# Patient Record
Sex: FEMALE | Race: WHITE | Employment: OTHER
[De-identification: names, ages, dates, MRNs, and addresses within clinical notes are randomized per-mention and may not be internally consistent; named-entity substitution may affect disease eponyms.]

---

## 2017-01-27 ENCOUNTER — OFFICE VISIT (OUTPATIENT)
Dept: FAMILY MEDICINE CLINIC | Facility: CLINIC | Age: 72
End: 2017-01-27

## 2017-01-27 VITALS
DIASTOLIC BLOOD PRESSURE: 70 MMHG | RESPIRATION RATE: 20 BRPM | WEIGHT: 136.8 LBS | BODY MASS INDEX: 25.5 KG/M2 | HEART RATE: 60 BPM | SYSTOLIC BLOOD PRESSURE: 128 MMHG

## 2017-01-27 DIAGNOSIS — I10 ESSENTIAL HYPERTENSION: Primary | ICD-10-CM

## 2017-01-27 DIAGNOSIS — E78.2 MIXED HYPERLIPIDEMIA: ICD-10-CM

## 2017-01-27 PROCEDURE — 4040F PNEUMOC VAC/ADMIN/RCVD: CPT | Performed by: FAMILY MEDICINE

## 2017-01-27 PROCEDURE — 99213 OFFICE O/P EST LOW 20 MIN: CPT | Performed by: FAMILY MEDICINE

## 2017-01-27 PROCEDURE — 1036F TOBACCO NON-USER: CPT | Performed by: FAMILY MEDICINE

## 2017-01-27 PROCEDURE — G8400 PT W/DXA NO RESULTS DOC: HCPCS | Performed by: FAMILY MEDICINE

## 2017-01-27 PROCEDURE — 1090F PRES/ABSN URINE INCON ASSESS: CPT | Performed by: FAMILY MEDICINE

## 2017-01-27 PROCEDURE — G8420 CALC BMI NORM PARAMETERS: HCPCS | Performed by: FAMILY MEDICINE

## 2017-01-27 PROCEDURE — 1123F ACP DISCUSS/DSCN MKR DOCD: CPT | Performed by: FAMILY MEDICINE

## 2017-01-27 PROCEDURE — 3014F SCREEN MAMMO DOC REV: CPT | Performed by: FAMILY MEDICINE

## 2017-01-27 PROCEDURE — 3017F COLORECTAL CA SCREEN DOC REV: CPT | Performed by: FAMILY MEDICINE

## 2017-01-27 PROCEDURE — G8427 DOCREV CUR MEDS BY ELIG CLIN: HCPCS | Performed by: FAMILY MEDICINE

## 2017-01-27 PROCEDURE — G8484 FLU IMMUNIZE NO ADMIN: HCPCS | Performed by: FAMILY MEDICINE

## 2017-01-27 ASSESSMENT — ENCOUNTER SYMPTOMS
SHORTNESS OF BREATH: 0
ABDOMINAL PAIN: 0
CHEST TIGHTNESS: 0
BLOOD IN STOOL: 0

## 2017-03-10 ENCOUNTER — OFFICE VISIT (OUTPATIENT)
Dept: FAMILY MEDICINE CLINIC | Facility: CLINIC | Age: 72
End: 2017-03-10

## 2017-03-10 VITALS
DIASTOLIC BLOOD PRESSURE: 86 MMHG | BODY MASS INDEX: 25.35 KG/M2 | SYSTOLIC BLOOD PRESSURE: 140 MMHG | HEART RATE: 80 BPM | RESPIRATION RATE: 18 BRPM | WEIGHT: 136 LBS

## 2017-03-10 DIAGNOSIS — E78.2 MIXED HYPERLIPIDEMIA: ICD-10-CM

## 2017-03-10 DIAGNOSIS — M25.561 RIGHT KNEE PAIN, UNSPECIFIED CHRONICITY: Primary | ICD-10-CM

## 2017-03-10 PROCEDURE — 1036F TOBACCO NON-USER: CPT | Performed by: FAMILY MEDICINE

## 2017-03-10 PROCEDURE — 1123F ACP DISCUSS/DSCN MKR DOCD: CPT | Performed by: FAMILY MEDICINE

## 2017-03-10 PROCEDURE — G8427 DOCREV CUR MEDS BY ELIG CLIN: HCPCS | Performed by: FAMILY MEDICINE

## 2017-03-10 PROCEDURE — 3014F SCREEN MAMMO DOC REV: CPT | Performed by: FAMILY MEDICINE

## 2017-03-10 PROCEDURE — 99213 OFFICE O/P EST LOW 20 MIN: CPT | Performed by: FAMILY MEDICINE

## 2017-03-10 PROCEDURE — G8484 FLU IMMUNIZE NO ADMIN: HCPCS | Performed by: FAMILY MEDICINE

## 2017-03-10 PROCEDURE — 4040F PNEUMOC VAC/ADMIN/RCVD: CPT | Performed by: FAMILY MEDICINE

## 2017-03-10 PROCEDURE — 1090F PRES/ABSN URINE INCON ASSESS: CPT | Performed by: FAMILY MEDICINE

## 2017-03-10 PROCEDURE — 3017F COLORECTAL CA SCREEN DOC REV: CPT | Performed by: FAMILY MEDICINE

## 2017-03-10 PROCEDURE — G8420 CALC BMI NORM PARAMETERS: HCPCS | Performed by: FAMILY MEDICINE

## 2017-03-10 PROCEDURE — G8400 PT W/DXA NO RESULTS DOC: HCPCS | Performed by: FAMILY MEDICINE

## 2017-03-10 ASSESSMENT — ENCOUNTER SYMPTOMS
ABDOMINAL PAIN: 0
SHORTNESS OF BREATH: 0
CHEST TIGHTNESS: 0

## 2017-03-14 ENCOUNTER — TELEPHONE (OUTPATIENT)
Dept: ORTHOPEDIC SURGERY | Age: 72
End: 2017-03-14

## 2017-05-01 RX ORDER — LISINOPRIL AND HYDROCHLOROTHIAZIDE 12.5; 1 MG/1; MG/1
TABLET ORAL
Qty: 90 TABLET | Refills: 1 | Status: SHIPPED | OUTPATIENT
Start: 2017-05-01 | End: 2018-02-06 | Stop reason: SDUPTHER

## 2017-07-20 ENCOUNTER — TELEPHONE (OUTPATIENT)
Dept: FAMILY MEDICINE CLINIC | Age: 72
End: 2017-07-20

## 2017-07-20 DIAGNOSIS — E78.2 MIXED HYPERLIPIDEMIA: ICD-10-CM

## 2017-07-20 DIAGNOSIS — I10 ESSENTIAL HYPERTENSION: Primary | ICD-10-CM

## 2017-07-25 LAB
ALT SERPL-CCNC: 15 IU/L (ref 5–59)
ANION GAP SERPL CALCULATED.3IONS-SCNC: 11 MMOL/L
AST SERPL-CCNC: 14 IU/L (ref 10–42)
BUN BLDV-MCNC: 11 MG/DL (ref 10–20)
CALCIUM SERPL-MCNC: 9.4 MG/DL (ref 8.7–10.8)
CHLORIDE BLD-SCNC: 101 MMOL/L (ref 95–111)
CHOLESTEROL/HDL RATIO: 4
CHOLESTEROL: 161 MG/DL
CO2: 29 MMOL/L (ref 21–32)
CREAT SERPL-MCNC: 0.7 MG/DL (ref 0.5–1.3)
EGFR AFRICAN AMERICAN: 100
EGFR IF NONAFRICAN AMERICAN: 83
GLUCOSE: 91 MG/DL (ref 70–100)
HDLC SERPL-MCNC: 40 MG/DL (ref 40–60)
LDL CHOLESTEROL CALCULATED: 97 MG/DL
LDL/HDL RATIO: 2.4
MAGNESIUM: 2.2 MG/DL (ref 1.7–2.4)
POTASSIUM SERPL-SCNC: 4.1 MMOL/L (ref 3.5–5.4)
SODIUM BLD-SCNC: 137 MMOL/L (ref 134–147)
TRIGL SERPL-MCNC: 122 MG/DL
VLDLC SERPL CALC-MCNC: 24 MG/DL

## 2017-07-28 ENCOUNTER — OFFICE VISIT (OUTPATIENT)
Dept: FAMILY MEDICINE CLINIC | Age: 72
End: 2017-07-28
Payer: MEDICARE

## 2017-07-28 VITALS
HEIGHT: 59 IN | SYSTOLIC BLOOD PRESSURE: 120 MMHG | WEIGHT: 136.4 LBS | DIASTOLIC BLOOD PRESSURE: 70 MMHG | BODY MASS INDEX: 27.5 KG/M2 | RESPIRATION RATE: 17 BRPM | HEART RATE: 60 BPM

## 2017-07-28 DIAGNOSIS — I10 ESSENTIAL HYPERTENSION: Primary | ICD-10-CM

## 2017-07-28 DIAGNOSIS — Z12.11 SCREENING FOR COLON CANCER: ICD-10-CM

## 2017-07-28 DIAGNOSIS — E78.2 MIXED HYPERLIPIDEMIA: ICD-10-CM

## 2017-07-28 DIAGNOSIS — Z12.31 ENCOUNTER FOR SCREENING MAMMOGRAM FOR BREAST CANCER: ICD-10-CM

## 2017-07-28 PROCEDURE — 99213 OFFICE O/P EST LOW 20 MIN: CPT | Performed by: FAMILY MEDICINE

## 2017-07-28 PROCEDURE — G8400 PT W/DXA NO RESULTS DOC: HCPCS | Performed by: FAMILY MEDICINE

## 2017-07-28 PROCEDURE — 4040F PNEUMOC VAC/ADMIN/RCVD: CPT | Performed by: FAMILY MEDICINE

## 2017-07-28 PROCEDURE — G8427 DOCREV CUR MEDS BY ELIG CLIN: HCPCS | Performed by: FAMILY MEDICINE

## 2017-07-28 PROCEDURE — G8419 CALC BMI OUT NRM PARAM NOF/U: HCPCS | Performed by: FAMILY MEDICINE

## 2017-07-28 PROCEDURE — 1090F PRES/ABSN URINE INCON ASSESS: CPT | Performed by: FAMILY MEDICINE

## 2017-07-28 PROCEDURE — 1036F TOBACCO NON-USER: CPT | Performed by: FAMILY MEDICINE

## 2017-07-28 PROCEDURE — 1123F ACP DISCUSS/DSCN MKR DOCD: CPT | Performed by: FAMILY MEDICINE

## 2017-07-28 PROCEDURE — 3017F COLORECTAL CA SCREEN DOC REV: CPT | Performed by: FAMILY MEDICINE

## 2017-07-28 PROCEDURE — 3014F SCREEN MAMMO DOC REV: CPT | Performed by: FAMILY MEDICINE

## 2017-07-28 ASSESSMENT — PATIENT HEALTH QUESTIONNAIRE - PHQ9
SUM OF ALL RESPONSES TO PHQ9 QUESTIONS 1 & 2: 0
SUM OF ALL RESPONSES TO PHQ QUESTIONS 1-9: 0
2. FEELING DOWN, DEPRESSED OR HOPELESS: 0
1. LITTLE INTEREST OR PLEASURE IN DOING THINGS: 0

## 2017-07-28 ASSESSMENT — ENCOUNTER SYMPTOMS
ABDOMINAL PAIN: 0
BLOOD IN STOOL: 0
SHORTNESS OF BREATH: 0
CHEST TIGHTNESS: 0

## 2017-09-15 ENCOUNTER — TELEPHONE (OUTPATIENT)
Dept: FAMILY MEDICINE CLINIC | Age: 72
End: 2017-09-15

## 2017-09-15 DIAGNOSIS — Z12.11 SCREENING FOR COLON CANCER: ICD-10-CM

## 2017-09-15 LAB
CONTROL: PRESENT
HEMOCCULT STL QL: NEGATIVE

## 2017-09-15 PROCEDURE — 82274 ASSAY TEST FOR BLOOD FECAL: CPT | Performed by: FAMILY MEDICINE

## 2017-10-03 ENCOUNTER — HOSPITAL ENCOUNTER (EMERGENCY)
Age: 72
Discharge: HOME | End: 2017-10-03
Attending: SPECIALIST
Payer: MEDICARE

## 2017-10-03 VITALS
HEIGHT: 59 IN | DIASTOLIC BLOOD PRESSURE: 71 MMHG | TEMPERATURE: 99.8 F | HEART RATE: 82 BPM | WEIGHT: 130 LBS | OXYGEN SATURATION: 96 % | SYSTOLIC BLOOD PRESSURE: 169 MMHG | BODY MASS INDEX: 26.21 KG/M2 | RESPIRATION RATE: 18 BRPM

## 2017-10-03 DIAGNOSIS — S50.11XA TRAUMATIC HEMATOMA OF RIGHT FOREARM, INITIAL ENCOUNTER: Primary | ICD-10-CM

## 2017-10-03 PROCEDURE — 99283 EMERGENCY DEPT VISIT LOW MDM: CPT

## 2017-10-03 ASSESSMENT — PAIN SCALES - GENERAL: PAINLEVEL_OUTOF10: 1

## 2017-10-03 ASSESSMENT — PAIN DESCRIPTION - LOCATION: LOCATION: ARM

## 2017-10-03 ASSESSMENT — PAIN DESCRIPTION - ORIENTATION: ORIENTATION: RIGHT

## 2017-10-03 NOTE — ED AVS SNAPSHOT
After Visit Summary  (Discharge Instructions)    Medication List for Home    Based on the information you provided to us as well as any changes during this visit, the following is your updated medication list.  Compare this with your prescription bottles at home. If you have any questions or concerns, contact your primary care physician's office. Daily Medication List (This medication list can be shared with any Healthcare provider who is helping you manage your medications)      These are medications you told us you were taking at home, CONTINUE taking them after you leave the hospital     atorvastatin 10 MG tablet   Commonly known as:  LIPITOR   TAKE 1 TABLET BY MOUTH ONE TIME A DAY       lisinopril-hydrochlorothiazide 10-12.5 MG per tablet   Commonly known as:  PRINZIDE;ZESTORETIC   take 1 tablet by mouth once daily               Allergies as of 10/3/2017     No Known Allergies      Immunizations as of 10/3/2017     No immunizations on file. After Visit Summary    This summary was created for you. Thank you for entrusting your care to us. The following information includes details about your hospital/visit stay along with steps you should take to help with your recovery once you leave the hospital.  In this packet, you will find information about the topics listed below:    · Instructions about your medications including a list of your home medications  · A summary of your hospital visit  · Follow-up appointments once you have left the hospital  · Your care plan at home      You may receive a survey regarding the care you received during your stay. Your input is valuable to us. We encourage you to complete and return your survey in the envelope provided. We hope you will choose us in the future for your healthcare needs.           Patient Information     Patient Name IRMA Mcadams 1945      Care Provided at:     Name             Geronimo Kerr Your Visit    Here you will find information about your visit, including the reason for your visit. Please take this sheet with you when you visit your doctor or other health care provider in the future. It will help determine the best possible medical care for you at that time. If you have any questions once you leave the hospital, please call the department phone number listed below. Diagnoses this visit     Your diagnosis was TRAUMATIC HEMATOMA OF RIGHT FOREARM, INITIAL ENCOUNTER. Visit Information     Date of Visit Department Dept Phone    10/3/2017 [facility] [phone]      You were seen by     You were seen by Freddie Arango MD.       Follow-up Appointments    Below is a list of your follow-up and future appointments. This may not be a complete list as you may have made appointments directly with providers that we are not aware of or your providers may have made some for you. Please call your providers to confirm appointments. It is important to keep your appointments. Please bring your current insurance card, photo ID, co-pay, and all medication bottles to your appointment. If self-pay, payment is expected at the time of service. Follow-up Information     Follow up with Virginie Li MD. Call in 3 days. Specialty:  Family Medicine    Why:  For reevaluation of current symptoms    Contact information:    [address] [facility] [address] [phone]          Follow up with [facility]. Specialty:  Emergency Medicine    Why:  If symptoms worsen    Contact information:    [address] [facility] [address] [phone]      Future Appointments     2/2/2018 3:00 PM     Appointment with Virginie Li MD at [facility] ([phone])   Please arrive 15 minutes prior to appointment, bring photo ID and insurance card.    [address]  [address] You can view your After Visit Summary by going to [email or web address] and logging in with your [email or web address] username and password. If you don't have a [email or web address] username and password but a parent or guardian has access to your record, the parent or guardian should login with their own [email or web address] username and password and access your record to view the After Visit Summary. Additional Information  If you have questions, please contact the physician practice where you receive care. Remember, [employer] is NOT to be used for urgent needs. For medical emergencies, dial 911. For questions regarding your DailyCredhart account call [phone]. If you have a clinical question, please call your doctor's office. View your information online  ? Review your current list of  medications, immunization, and allergies. ? Review your future test results online . ? Review your discharge instructions provided by your caregivers at discharge    Certain functionality such as prescription refills, scheduling appointments or sending messages to your provider are not activated if your provider does not use [employer] in his/her office    For questions regarding your MyChart account call [phone]. If you have a clinical question, please call your doctor's office. The information on all pages of the After Visit Summary has been reviewed with me, the patient and/or responsible adult, by my health care provider(s). I had the opportunity to ask questions regarding this information. I understand I should dispose of my armband safely at home to protect my health information. A complete copy of the After Visit Summary has been given to me, the patient and/or responsible adult.          Patient Signature/Responsible Adult: ___________________________________    Nurse Signature: ___________________________________  Resident/MLP Signature: ___________________________________ Attending Signature: ___________________________________    Date:____________Time:____________              Discharge Instructions            Hematoma: Care Instructions  Your Care Instructions  A hematoma is a bad bruise. It happens when an injury causes blood to collect and pool under the skin. The pooling blood gives the skin a spongy, rubbery, lumpy feel. A hematoma usually is not a cause for concern. It is not the same thing as a blood clot in a vein, and it does not cause blood clots. Follow-up care is a key part of your treatment and safety. Be sure to make and go to all appointments, and call your doctor if you are having problems. It's also a good idea to know your test results and keep a list of the medicines you take. How can you care for yourself at home? · Rest and protect the bruised area. · Put ice or a cold pack on the area for 10 to 20 minutes at a time. · Prop up the bruised area on a pillow when you ice it or anytime you sit or lie down during the next 3 days. Try to keep it above the level of your heart. This will help reduce swelling. · Wrapping the bruised area with an elastic bandage such as an Ace wrap will help decrease swelling. Don't wrap it too tightly, as this can cause more swelling below the affected area. · Take an over-the-counter pain medicine, such as acetaminophen (Tylenol), ibuprofen (Advil, Motrin), or naproxen (Aleve). · Do not take two or more pain medicines at the same time unless the doctor told you to. Many pain medicines have acetaminophen, which is Tylenol. Too much acetaminophen (Tylenol) can be harmful. When should you call for help? Call your doctor now or seek immediate medical care if:  · You have signs of skin infection, such as:  ¨ Increased pain, swelling, warmth, or redness. ¨ Red streaks leading from the area. ¨ Pus draining from the area. ¨ A fever.   Watch closely for changes in your health, and be sure to contact your doctor if:

## 2017-10-04 ENCOUNTER — CARE COORDINATION (OUTPATIENT)
Dept: CARE COORDINATION | Age: 72
End: 2017-10-04

## 2017-10-04 NOTE — ED PROVIDER NOTES
lisinopril-hydrochlorothiazide (PRINZIDE;ZESTORETIC) 10-12.5 MG per tablet take 1 tablet by mouth once daily, Disp-90 tablet, R-1Normal      atorvastatin (LIPITOR) 10 MG tablet TAKE 1 TABLET BY MOUTH ONE TIME A DAY , Disp-30 tablet, R-3Normal             ALLERGIES     has No Known Allergies. FAMILY HISTORY     indicated that her mother is . family history includes High Blood Pressure in her mother. SOCIAL HISTORY      reports that she has never smoked. She has never used smokeless tobacco. She reports that she does not drink alcohol. PHYSICAL EXAM     INITIAL VITALS:  height is 4' 11\" (1.499 m) and weight is 59 kg (130 lb). Her oral temperature is 99.8 °F (37.7 °C). Her blood pressure is 169/71 (abnormal) and her pulse is 82. Her respiration is 18 and oxygen saturation is 96%. Physical Exam   Constitutional: She is oriented to person, place, and time. She appears well-developed and well-nourished. HENT:   Head: Normocephalic and atraumatic. Eyes: EOM are normal. Pupils are equal, round, and reactive to light. Neck: Normal range of motion. Neck supple. Cardiovascular: Normal rate, regular rhythm, normal heart sounds and intact distal pulses. Pulmonary/Chest: Effort normal and breath sounds normal.   Abdominal: Soft. Bowel sounds are normal.   Musculoskeletal:        Right forearm: She exhibits tenderness and swelling. She exhibits no bony tenderness and no deformity. Patient has a hematoma on the dorsal aspect of the right proximal forearm, skin is intact. She has full range of movements of the right elbow and right wrist including pronation and supination without any pain or discomfort. Neurovascular examination is intact distally. Neurological: She is alert and oriented to person, place, and time. Skin: Skin is warm and dry. Nursing note and vitals reviewed.       DIFFERENTIAL DIAGNOSIS/ MDM:     Right proximal forearm hematoma with full range of motion movements of the joint proximal and distal.  Will reassure and treat conservatively. DIAGNOSTIC RESULTS     EKG: All EKG's are interpreted by the Emergency Department Physician who either signs or Co-signs this chart in the absence of a cardiologist.    Unobtainable    RADIOLOGY:   Non-plain film images such as CT, Ultrasound and MRI are read by the radiologist. Plain radiographic images are visualized and the radiologist interpretations are reviewed as follows:     None clinically indicated    LABS:  No results found for this visit on 10/03/17. EMERGENCY DEPARTMENT COURSE:   Vitals:    Vitals:    10/03/17 2101   BP: (!) 169/71   Pulse: 82   Resp: 18   Temp: 99.8 °F (37.7 °C)   TempSrc: Oral   SpO2: 96%   Weight: 59 kg (130 lb)   Height: 4' 11\" (1.499 m)     -------------------------  BP: (!) 169/71, Temp: 99.8 °F (37.7 °C), Pulse: 82, Resp: 18    No orders of the defined types were placed in this encounter. During emergency department course, patient is resting comfortably and does not appear to be in any pain or distress. She declined any pain medications. She was reassured and discharged home with instructions to apply ice packs locally, take Tylenol and/or ibuprofen as needed, follow up with PCP, return if worse. CONSULTS:  None    PROCEDURES:  None    FINAL IMPRESSION      1. Traumatic hematoma of right forearm, initial encounter          DISPOSITION/PLAN       PATIENT REFERRED TO:  Gabby Soliz MD  [facility]. [address]  [address] [facility] [phone]    Call in 3 days  For reevaluation of current symptoms    [facility] ED  [address]   [address] [facility] [place] [address] [phone]    If symptoms worsen      DISCHARGE MEDICATIONS:  Discharge Medication List as of 10/3/2017  9:31 PM          (Please note that portions of this note were completed with a voice recognition program.  Efforts were made to edit the dictations but occasionally words are

## 2017-11-20 RX ORDER — ATORVASTATIN CALCIUM 10 MG/1
TABLET, FILM COATED ORAL
Qty: 90 TABLET | Refills: 1 | Status: SHIPPED | OUTPATIENT
Start: 2017-11-20 | End: 2018-05-20 | Stop reason: SDUPTHER

## 2018-01-25 ENCOUNTER — TELEPHONE (OUTPATIENT)
Dept: FAMILY MEDICINE CLINIC | Age: 73
End: 2018-01-25

## 2018-01-25 DIAGNOSIS — I10 ESSENTIAL HYPERTENSION: Primary | ICD-10-CM

## 2018-01-25 DIAGNOSIS — E78.2 MIXED HYPERLIPIDEMIA: ICD-10-CM

## 2018-01-25 NOTE — TELEPHONE ENCOUNTER
Patient requesting labs before her appointment on 2-2-18. I will fax them to [facility] on [address], fax # [phone].

## 2018-01-30 LAB
ALT SERPL-CCNC: 17 IU/L (ref 5–59)
ANION GAP SERPL CALCULATED.3IONS-SCNC: 15 MMOL/L
AST SERPL-CCNC: 16 IU/L (ref 10–42)
BUN BLDV-MCNC: 11 MG/DL (ref 10–20)
CALCIUM SERPL-MCNC: 10 MG/DL (ref 8.7–10.8)
CHLORIDE BLD-SCNC: 97 MMOL/L (ref 95–111)
CHOLESTEROL/HDL RATIO: 3.4
CHOLESTEROL: 168 MG/DL
CO2: 31 MMOL/L (ref 21–32)
CREAT SERPL-MCNC: 0.7 MG/DL (ref 0.5–1.3)
EGFR AFRICAN AMERICAN: 100
EGFR IF NONAFRICAN AMERICAN: 83
GLUCOSE: 99 MG/DL (ref 70–100)
HDLC SERPL-MCNC: 50 MG/DL (ref 40–60)
LDL CHOLESTEROL CALCULATED: 94 MG/DL
LDL/HDL RATIO: 1.9
MAGNESIUM: 2.2 MG/DL (ref 1.7–2.4)
POTASSIUM SERPL-SCNC: 4 MMOL/L (ref 3.5–5.4)
SODIUM BLD-SCNC: 139 MMOL/L (ref 134–147)
TRIGL SERPL-MCNC: 121 MG/DL
VLDLC SERPL CALC-MCNC: 24 MG/DL

## 2018-02-06 RX ORDER — LISINOPRIL AND HYDROCHLOROTHIAZIDE 12.5; 1 MG/1; MG/1
TABLET ORAL
Qty: 90 TABLET | Refills: 1 | Status: SHIPPED | OUTPATIENT
Start: 2018-02-06 | End: 2018-05-23 | Stop reason: SDUPTHER

## 2018-05-21 RX ORDER — ATORVASTATIN CALCIUM 10 MG/1
TABLET, FILM COATED ORAL
Qty: 90 TABLET | Refills: 0 | Status: SHIPPED | OUTPATIENT
Start: 2018-05-21 | End: 2018-08-17 | Stop reason: SDUPTHER

## 2018-05-23 RX ORDER — LISINOPRIL AND HYDROCHLOROTHIAZIDE 12.5; 1 MG/1; MG/1
TABLET ORAL
Qty: 90 TABLET | Refills: 0 | Status: SHIPPED | OUTPATIENT
Start: 2018-05-23 | End: 2018-08-17 | Stop reason: SDUPTHER

## 2018-06-22 ENCOUNTER — TELEPHONE (OUTPATIENT)
Dept: FAMILY MEDICINE CLINIC | Age: 73
End: 2018-06-22

## 2018-06-22 DIAGNOSIS — E78.2 MIXED HYPERLIPIDEMIA: ICD-10-CM

## 2018-06-22 DIAGNOSIS — I10 ESSENTIAL HYPERTENSION: Primary | ICD-10-CM

## 2018-06-25 LAB
ALT SERPL-CCNC: 15 U/L
AST SERPL-CCNC: 14 U/L
BUN BLDV-MCNC: 9 MG/DL
CALCIUM SERPL-MCNC: 9.1 MG/DL
CHLORIDE BLD-SCNC: 99 MMOL/L
CHOLESTEROL, TOTAL: 162 MG/DL
CHOLESTEROL/HDL RATIO: 3.8
CO2: 29 MMOL/L
CREAT SERPL-MCNC: 0.6 MG/DL
GFR CALCULATED: 91.7
GLUCOSE BLD-MCNC: 99 MG/DL
HDLC SERPL-MCNC: 43 MG/DL (ref 35–70)
LDL CHOLESTEROL CALCULATED: 95 MG/DL (ref 0–160)
POTASSIUM SERPL-SCNC: 4.3 MMOL/L
SODIUM BLD-SCNC: 139 MMOL/L
TRIGL SERPL-MCNC: 118 MG/DL
VLDLC SERPL CALC-MCNC: 24 MG/DL

## 2018-06-26 DIAGNOSIS — E78.2 MIXED HYPERLIPIDEMIA: ICD-10-CM

## 2018-06-26 DIAGNOSIS — I10 ESSENTIAL HYPERTENSION: ICD-10-CM

## 2018-06-29 ENCOUNTER — OFFICE VISIT (OUTPATIENT)
Dept: FAMILY MEDICINE CLINIC | Age: 73
End: 2018-06-29
Payer: MEDICARE

## 2018-06-29 VITALS
HEART RATE: 72 BPM | DIASTOLIC BLOOD PRESSURE: 70 MMHG | SYSTOLIC BLOOD PRESSURE: 124 MMHG | BODY MASS INDEX: 27.67 KG/M2 | RESPIRATION RATE: 14 BRPM | WEIGHT: 137 LBS

## 2018-06-29 DIAGNOSIS — Z12.31 ENCOUNTER FOR SCREENING MAMMOGRAM FOR BREAST CANCER: ICD-10-CM

## 2018-06-29 DIAGNOSIS — E78.2 MIXED HYPERLIPIDEMIA: ICD-10-CM

## 2018-06-29 DIAGNOSIS — I10 ESSENTIAL HYPERTENSION: Primary | ICD-10-CM

## 2018-06-29 PROCEDURE — G8400 PT W/DXA NO RESULTS DOC: HCPCS | Performed by: FAMILY MEDICINE

## 2018-06-29 PROCEDURE — 1036F TOBACCO NON-USER: CPT | Performed by: FAMILY MEDICINE

## 2018-06-29 PROCEDURE — 1090F PRES/ABSN URINE INCON ASSESS: CPT | Performed by: FAMILY MEDICINE

## 2018-06-29 PROCEDURE — 4040F PNEUMOC VAC/ADMIN/RCVD: CPT | Performed by: FAMILY MEDICINE

## 2018-06-29 PROCEDURE — 99213 OFFICE O/P EST LOW 20 MIN: CPT | Performed by: FAMILY MEDICINE

## 2018-06-29 PROCEDURE — 1123F ACP DISCUSS/DSCN MKR DOCD: CPT | Performed by: FAMILY MEDICINE

## 2018-06-29 PROCEDURE — G8417 CALC BMI ABV UP PARAM F/U: HCPCS | Performed by: FAMILY MEDICINE

## 2018-06-29 PROCEDURE — G8427 DOCREV CUR MEDS BY ELIG CLIN: HCPCS | Performed by: FAMILY MEDICINE

## 2018-06-29 PROCEDURE — 3017F COLORECTAL CA SCREEN DOC REV: CPT | Performed by: FAMILY MEDICINE

## 2018-06-29 ASSESSMENT — ENCOUNTER SYMPTOMS
ABDOMINAL PAIN: 0
CHEST TIGHTNESS: 0
SHORTNESS OF BREATH: 0
BLOOD IN STOOL: 0

## 2018-06-29 ASSESSMENT — PATIENT HEALTH QUESTIONNAIRE - PHQ9
SUM OF ALL RESPONSES TO PHQ9 QUESTIONS 1 & 2: 0
SUM OF ALL RESPONSES TO PHQ QUESTIONS 1-9: 0
1. LITTLE INTEREST OR PLEASURE IN DOING THINGS: 0
2. FEELING DOWN, DEPRESSED OR HOPELESS: 0

## 2018-08-17 RX ORDER — LISINOPRIL AND HYDROCHLOROTHIAZIDE 12.5; 1 MG/1; MG/1
TABLET ORAL
Qty: 90 TABLET | Refills: 0 | Status: SHIPPED | OUTPATIENT
Start: 2018-08-17 | End: 2018-11-20 | Stop reason: SDUPTHER

## 2018-08-17 RX ORDER — ATORVASTATIN CALCIUM 10 MG/1
TABLET, FILM COATED ORAL
Qty: 90 TABLET | Refills: 0 | Status: SHIPPED | OUTPATIENT
Start: 2018-08-17 | End: 2018-11-20 | Stop reason: SDUPTHER

## 2018-11-20 RX ORDER — ATORVASTATIN CALCIUM 10 MG/1
TABLET, FILM COATED ORAL
Qty: 90 TABLET | Refills: 0 | Status: SHIPPED | OUTPATIENT
Start: 2018-11-20 | End: 2019-02-19 | Stop reason: SDUPTHER

## 2018-11-20 RX ORDER — LISINOPRIL AND HYDROCHLOROTHIAZIDE 12.5; 1 MG/1; MG/1
TABLET ORAL
Qty: 90 TABLET | Refills: 0 | Status: SHIPPED | OUTPATIENT
Start: 2018-11-20 | End: 2019-02-19 | Stop reason: SDUPTHER

## 2019-01-10 ENCOUNTER — TELEPHONE (OUTPATIENT)
Dept: FAMILY MEDICINE CLINIC | Age: 74
End: 2019-01-10

## 2019-01-10 DIAGNOSIS — E78.2 MIXED HYPERLIPIDEMIA: ICD-10-CM

## 2019-01-10 DIAGNOSIS — I10 ESSENTIAL HYPERTENSION: Primary | ICD-10-CM

## 2019-01-14 LAB
ALT SERPL-CCNC: 23 U/L
AST SERPL-CCNC: 17 U/L
BUN BLDV-MCNC: 10 MG/DL
CALCIUM SERPL-MCNC: 9.7 MG/DL
CHLORIDE BLD-SCNC: 98 MMOL/L
CHOLESTEROL, TOTAL: 181 MG/DL
CHOLESTEROL/HDL RATIO: NORMAL
CO2: 32 MMOL/L
CREAT SERPL-MCNC: 0.6 MG/DL
GFR CALCULATED: 91.7
GLUCOSE BLD-MCNC: 104 MG/DL
HDLC SERPL-MCNC: 48 MG/DL (ref 35–70)
LDL CHOLESTEROL CALCULATED: 111 MG/DL (ref 0–160)
MAGNESIUM: 2.1 MG/DL
POTASSIUM SERPL-SCNC: 4.1 MMOL/L
SODIUM BLD-SCNC: 139 MMOL/L
TRIGL SERPL-MCNC: 113 MG/DL
VLDLC SERPL CALC-MCNC: 23 MG/DL

## 2019-01-18 DIAGNOSIS — E78.2 MIXED HYPERLIPIDEMIA: ICD-10-CM

## 2019-01-18 DIAGNOSIS — I10 ESSENTIAL HYPERTENSION: ICD-10-CM

## 2019-05-24 ENCOUNTER — OFFICE VISIT (OUTPATIENT)
Dept: FAMILY MEDICINE CLINIC | Age: 74
End: 2019-05-24
Payer: MEDICARE

## 2019-05-24 VITALS
WEIGHT: 143 LBS | DIASTOLIC BLOOD PRESSURE: 80 MMHG | HEART RATE: 64 BPM | SYSTOLIC BLOOD PRESSURE: 130 MMHG | RESPIRATION RATE: 14 BRPM | BODY MASS INDEX: 28.88 KG/M2

## 2019-05-24 DIAGNOSIS — R73.01 IMPAIRED FASTING GLUCOSE: ICD-10-CM

## 2019-05-24 DIAGNOSIS — I10 ESSENTIAL HYPERTENSION: Primary | ICD-10-CM

## 2019-05-24 DIAGNOSIS — E78.2 MIXED HYPERLIPIDEMIA: ICD-10-CM

## 2019-05-24 PROCEDURE — 1090F PRES/ABSN URINE INCON ASSESS: CPT | Performed by: FAMILY MEDICINE

## 2019-05-24 PROCEDURE — 4040F PNEUMOC VAC/ADMIN/RCVD: CPT | Performed by: FAMILY MEDICINE

## 2019-05-24 PROCEDURE — G8417 CALC BMI ABV UP PARAM F/U: HCPCS | Performed by: FAMILY MEDICINE

## 2019-05-24 PROCEDURE — 99214 OFFICE O/P EST MOD 30 MIN: CPT | Performed by: FAMILY MEDICINE

## 2019-05-24 PROCEDURE — 1123F ACP DISCUSS/DSCN MKR DOCD: CPT | Performed by: FAMILY MEDICINE

## 2019-05-24 PROCEDURE — G8427 DOCREV CUR MEDS BY ELIG CLIN: HCPCS | Performed by: FAMILY MEDICINE

## 2019-05-24 PROCEDURE — 3017F COLORECTAL CA SCREEN DOC REV: CPT | Performed by: FAMILY MEDICINE

## 2019-05-24 PROCEDURE — G8400 PT W/DXA NO RESULTS DOC: HCPCS | Performed by: FAMILY MEDICINE

## 2019-05-24 PROCEDURE — 1036F TOBACCO NON-USER: CPT | Performed by: FAMILY MEDICINE

## 2019-05-24 ASSESSMENT — PATIENT HEALTH QUESTIONNAIRE - PHQ9
SUM OF ALL RESPONSES TO PHQ QUESTIONS 1-9: 0
SUM OF ALL RESPONSES TO PHQ QUESTIONS 1-9: 0
SUM OF ALL RESPONSES TO PHQ9 QUESTIONS 1 & 2: 0
1. LITTLE INTEREST OR PLEASURE IN DOING THINGS: 0
2. FEELING DOWN, DEPRESSED OR HOPELESS: 0

## 2019-05-24 ASSESSMENT — ENCOUNTER SYMPTOMS
CHEST TIGHTNESS: 0
SHORTNESS OF BREATH: 0
ABDOMINAL PAIN: 0
BLOOD IN STOOL: 0

## 2019-05-24 NOTE — PROGRESS NOTES
Subjective:      Patient ID: Supriya Bhagat is a 72 y.o. female. Chronic Disease Visit Information    BP Readings from Last 3 Encounters:   06/29/18 124/70   10/03/17 (!) 169/71   07/28/17 120/70          LDL Calculated (mg/dL)   Date Value   01/14/2019 111     HDL (mg/dL)   Date Value   01/14/2019 48     BUN (mg/dL)   Date Value   01/14/2019 10     CREATININE (no units)   Date Value   01/14/2019 0.6     Glucose (mg/dL)   Date Value   01/14/2019 104   01/29/2018 99            Have you changed or started any medications since your last visit including any over-the-counter medicines, vitamins, or herbal medicines? no   Are you having any side effects from any of your medications? -  no  Have you stopped taking any of your medications? Is so, why? -  no    Have you seen any other physician or provider since your last visit? No  Have you had any other diagnostic tests since your last visit? No  Have you been seen in the emergency room and/or had an admission to a hospital since we last saw you? No  Have you had your annual diabetic retinal (eye) exam? No  Have you had your routine dental cleaning in the past 6 months? no    Have you activated your [email or web address] account? If not, what are your barriers?  Yes     Patient Care Team:  Dhaval West MD as PCP - General (Family Medicine)  Dhaval West MD as PCP - [facility] Attributed Provider  Alberto Dominguez (Neurosurgery)  Eris Colon MD (Dermatology)  Dhaval West MD as Referring Physician (Family Medicine)         Medical History Review  Past Medical, Family, and Social History reviewed and does contribute to the patient presenting condition    Health Maintenance   Topic Date Due    Hepatitis C screen  1945    DTaP/Tdap/Td vaccine (1 - Tdap) 06/01/1966    Shingles Vaccine (1 of 2) 06/01/1997    Breast cancer screen  11/21/2016    Colon Cancer Screen FIT/FOBT  09/15/2018    Pneumococcal 65+ years Vaccine (1 of 2 - PCV13) 04/19/2024 (Originally 6/1/2012)    Flu vaccine (Season Ended) 09/01/2019    Potassium monitoring  01/14/2020    Creatinine monitoring  01/14/2020    Lipid screen  01/14/2024    DEXA (modify frequency per FRAX score)  Addressed     HPI  Patient is a 82-year-old white female who presents for hypertension, hyperlipidemia, impaired fasting glucose. Patient states that she is taking and tolerating her routine medication. She denies any chest pain, abdominal pain, shortness of breath, fever or chills. She states she has a good appetite and remains active. Review of Systems   Constitutional: Negative for chills and fever. HENT: Negative for congestion. Respiratory: Negative for chest tightness and shortness of breath. Cardiovascular: Negative for chest pain. Gastrointestinal: Negative for abdominal pain and blood in stool. Genitourinary: Negative for dysuria and hematuria. Skin: Negative for rash. Neurological: Negative for dizziness. Psychiatric/Behavioral: Negative for confusion. Objective:   Physical Exam   Constitutional: She is oriented to person, place, and time. She appears well-developed and well-nourished. No distress. HENT:   Head: Normocephalic and atraumatic. Right Ear: Tympanic membrane, external ear and ear canal normal.   Left Ear: Tympanic membrane, external ear and ear canal normal.   Nose: Nose normal.   Mouth/Throat: Oropharynx is clear and moist.   Eyes: Conjunctivae are normal. Right eye exhibits no discharge. Left eye exhibits no discharge. No scleral icterus. Neck: Neck supple. Cardiovascular: Normal rate, regular rhythm and normal heart sounds. Pulmonary/Chest: Effort normal and breath sounds normal. No respiratory distress. She has no wheezes. Abdominal: Soft. She exhibits no distension. There is no tenderness. Musculoskeletal: She exhibits no edema. Neurological: She is alert and oriented to person, place, and time. Skin: Skin is warm and dry. No rash noted. Psychiatric: She has a normal mood and affect. Her behavior is normal.   Nursing note and vitals reviewed. Assessment:       Diagnosis Orders   1. Essential hypertension  Comprehensive Metabolic Panel    Lipid Panel    Magnesium   2. Mixed hyperlipidemia  Comprehensive Metabolic Panel    Lipid Panel   3. Impaired fasting glucose  Comprehensive Metabolic Panel    Hemoglobin A1C           Plan:       Mark Galvez received counseling on the following healthy behaviors: nutrition and medication adherence  Reviewed prior labs and health maintenance  Continue current medications, diet and exercise. Discussed use, benefit, and side effects of prescribed medications. Barriers to medication compliance addressed. Patient given educational materials - see patient instructions  Was a self-tracking handout given in paper form or via [employer]? No:     Requested Prescriptions      No prescriptions requested or ordered in this encounter       All patient questions answered. Patient voiced understanding. Quality Measures    Body mass index is 28.88 kg/m². Elevated. Weight control planned discussed Healthy diet and regular exercise. BP: 130/80. Blood pressure is normal. Treatment plan consists of Weight Reduction. Fall Risk 5/24/2019 7/28/2017 5/13/2016 8/15/2014   2 or more falls in past year? no no no no   Fall with injury in past year? no no no no     The patient does not have a history of falls. I did not - not indicated , complete a risk assessment for falls.  A plan of care for falls No Treatment plan indicated    Lab Results   Component Value Date    LDLCALC 111 01/14/2019    (goal LDL reduction with dx if diabetes is 50% LDL reduction)    PHQ Scores 5/24/2019 6/29/2018 7/28/2017 5/13/2016 3/27/2015 1/22/2014   PHQ2 Score 0 0 0 0 0 0   PHQ9 Score 0 0 0 0 0 0     Interpretation of Total Score Depression Severity: 1-4 = Minimal depression, 5-9 = Mild depression, 10-14 = Moderate depression, 15-19 = Moderately severe depression, 20-27 = Severe depression    Orders Placed This Encounter   Procedures    Comprehensive Metabolic Panel     Fasting     Standing Status:   Future     Standing Expiration Date:   5/23/2020    Lipid Panel     Standing Status:   Future     Standing Expiration Date:   5/23/2020     Order Specific Question:   Is Patient Fasting?/# of Hours     Answer:    Fast 8-10 hours    Magnesium     Standing Status:   Future     Standing Expiration Date:   5/23/2020    Hemoglobin A1C     Standing Status:   Future     Standing Expiration Date:   5/23/2020         Continue routine medication  Follow-up in 6 months or sooner if needed

## 2019-08-20 RX ORDER — ATORVASTATIN CALCIUM 10 MG/1
TABLET, FILM COATED ORAL
Qty: 90 TABLET | Refills: 0 | Status: SHIPPED | OUTPATIENT
Start: 2019-08-20 | End: 2019-11-18 | Stop reason: SDUPTHER

## 2019-08-20 RX ORDER — LISINOPRIL AND HYDROCHLOROTHIAZIDE 12.5; 1 MG/1; MG/1
TABLET ORAL
Qty: 90 TABLET | Refills: 0 | Status: SHIPPED | OUTPATIENT
Start: 2019-08-20 | End: 2019-11-18 | Stop reason: SDUPTHER

## 2019-08-31 ENCOUNTER — HOSPITAL ENCOUNTER (EMERGENCY)
Age: 74
Discharge: HOME | End: 2019-08-31
Attending: EMERGENCY MEDICINE
Payer: MEDICARE

## 2019-08-31 VITALS
HEART RATE: 76 BPM | HEIGHT: 60 IN | OXYGEN SATURATION: 96 % | SYSTOLIC BLOOD PRESSURE: 157 MMHG | WEIGHT: 140 LBS | BODY MASS INDEX: 27.48 KG/M2 | TEMPERATURE: 98.2 F | DIASTOLIC BLOOD PRESSURE: 61 MMHG | RESPIRATION RATE: 16 BRPM

## 2019-08-31 DIAGNOSIS — M79.89 SWELLING OF RIGHT RING FINGER: Primary | ICD-10-CM

## 2019-08-31 PROCEDURE — 99282 EMERGENCY DEPT VISIT SF MDM: CPT

## 2019-08-31 NOTE — ED PROVIDER NOTES
indicated OR per attending note    RADIOLOGY:   Non-plain film images such as CT, Ultrasound and MRI are read by the radiologist. Plain radiographic images are visualized and preliminarily interpreted by the emergency physician with the below findings:      Interpretation per the Radiologist below, if available at the time of this note:    No orders to display           LABS:  Labs Reviewed - No data to display      All other labs were within normal range or not returned as of this dictation. EMERGENCY DEPARTMENT COURSE and DIFFERENTIAL DIAGNOSIS/MDM:   Vitals:    Vitals:    08/31/19 1132   BP: (!) 157/61   Pulse: 76   Resp: 16   Temp: 98.2 °F (36.8 °C)   TempSrc: Oral   SpO2: 96%   Weight: 63.5 kg (140 lb)   Height: 5' (1.524 m)       1143  Patient here for ring removal, nontraumatic swelling of the proximal knuckle. Will be able to remove this ring for her. Ring removed by Guilherme Caballero our Paramedic. This was done without complication using a manual ring cutter. Patient had no complaints and ring was returned back to her. I have reviewed the disposition diagnosis with the patient and or their family/guardian. I have answered their questions and given discharge instructions. They voiced understanding of these instructions and did not have any further questions or complaints. CONSULTS:  None    PROCEDURES:  None    Patient instructed to return to the emergency room if symptoms worsen, return, or any other concern right away which is agreed. FINAL IMPRESSION      1. Swelling of right ring finger            DISPOSITION/PLAN   DISPOSITION       PATIENT REFERRED TO:  MD Major Balbuena [address]  [address]  [phone]    Go to   for worsening of your symptoms    [facility] ED  [address] [facility] [address]  [phone]  Go to   for worsening of your symptoms      DISCHARGE MEDICATIONS:  New Prescriptions    No medications on file       (Please note

## 2019-11-18 LAB
ALBUMIN SERPL-MCNC: 4.4 G/DL
ALP BLD-CCNC: 84 U/L
ALT SERPL-CCNC: 17 U/L
ANION GAP SERPL CALCULATED.3IONS-SCNC: 7 MMOL/L
AST SERPL-CCNC: 17 U/L
AVERAGE GLUCOSE: 120
BILIRUB SERPL-MCNC: 0.7 MG/DL (ref 0.1–1.4)
BUN BLDV-MCNC: 10 MG/DL
CALCIUM SERPL-MCNC: 9.7 MG/DL
CHLORIDE BLD-SCNC: 98 MMOL/L
CHOLESTEROL, TOTAL: 157 MG/DL
CHOLESTEROL/HDL RATIO: 3.6
CO2: 33 MMOL/L
CREAT SERPL-MCNC: 0.7 MG/DL
GFR CALCULATED: >60
GLUCOSE BLD-MCNC: 107 MG/DL
HBA1C MFR BLD: 5.8 %
HDLC SERPL-MCNC: 44 MG/DL (ref 35–70)
LDL CHOLESTEROL CALCULATED: 89 MG/DL (ref 0–160)
MAGNESIUM: 2.1 MG/DL
POTASSIUM SERPL-SCNC: 3.9 MMOL/L
SODIUM BLD-SCNC: 138 MMOL/L
TOTAL PROTEIN: 7.9
TRIGL SERPL-MCNC: 121 MG/DL
VLDLC SERPL CALC-MCNC: 24 MG/DL

## 2019-11-19 RX ORDER — ATORVASTATIN CALCIUM 10 MG/1
TABLET, FILM COATED ORAL
Qty: 90 TABLET | Refills: 0 | Status: SHIPPED | OUTPATIENT
Start: 2019-11-19 | End: 2020-03-04

## 2019-11-19 RX ORDER — LISINOPRIL AND HYDROCHLOROTHIAZIDE 12.5; 1 MG/1; MG/1
TABLET ORAL
Qty: 90 TABLET | Refills: 0 | Status: SHIPPED | OUTPATIENT
Start: 2019-11-19 | End: 2020-02-20

## 2019-11-20 DIAGNOSIS — I10 ESSENTIAL HYPERTENSION: ICD-10-CM

## 2019-11-20 DIAGNOSIS — E78.2 MIXED HYPERLIPIDEMIA: ICD-10-CM

## 2019-11-20 DIAGNOSIS — R73.01 IMPAIRED FASTING GLUCOSE: ICD-10-CM

## 2019-11-22 ENCOUNTER — OFFICE VISIT (OUTPATIENT)
Dept: FAMILY MEDICINE CLINIC | Age: 74
End: 2019-11-22
Payer: MEDICARE

## 2019-11-22 VITALS
TEMPERATURE: 98.6 F | DIASTOLIC BLOOD PRESSURE: 76 MMHG | SYSTOLIC BLOOD PRESSURE: 160 MMHG | HEIGHT: 59 IN | HEART RATE: 88 BPM | BODY MASS INDEX: 28.99 KG/M2 | WEIGHT: 143.8 LBS

## 2019-11-22 DIAGNOSIS — I10 ESSENTIAL HYPERTENSION: Primary | ICD-10-CM

## 2019-11-22 DIAGNOSIS — R73.01 IMPAIRED FASTING GLUCOSE: ICD-10-CM

## 2019-11-22 DIAGNOSIS — E78.2 MIXED HYPERLIPIDEMIA: ICD-10-CM

## 2019-11-22 DIAGNOSIS — Z12.11 COLON CANCER SCREENING: ICD-10-CM

## 2019-11-22 PROCEDURE — 1090F PRES/ABSN URINE INCON ASSESS: CPT | Performed by: FAMILY MEDICINE

## 2019-11-22 PROCEDURE — 3017F COLORECTAL CA SCREEN DOC REV: CPT | Performed by: FAMILY MEDICINE

## 2019-11-22 PROCEDURE — 1123F ACP DISCUSS/DSCN MKR DOCD: CPT | Performed by: FAMILY MEDICINE

## 2019-11-22 PROCEDURE — G8484 FLU IMMUNIZE NO ADMIN: HCPCS | Performed by: FAMILY MEDICINE

## 2019-11-22 PROCEDURE — 1036F TOBACCO NON-USER: CPT | Performed by: FAMILY MEDICINE

## 2019-11-22 PROCEDURE — G8400 PT W/DXA NO RESULTS DOC: HCPCS | Performed by: FAMILY MEDICINE

## 2019-11-22 PROCEDURE — G8417 CALC BMI ABV UP PARAM F/U: HCPCS | Performed by: FAMILY MEDICINE

## 2019-11-22 PROCEDURE — G8427 DOCREV CUR MEDS BY ELIG CLIN: HCPCS | Performed by: FAMILY MEDICINE

## 2019-11-22 PROCEDURE — 4040F PNEUMOC VAC/ADMIN/RCVD: CPT | Performed by: FAMILY MEDICINE

## 2019-11-22 PROCEDURE — 99214 OFFICE O/P EST MOD 30 MIN: CPT | Performed by: FAMILY MEDICINE

## 2019-11-22 ASSESSMENT — ENCOUNTER SYMPTOMS
CHEST TIGHTNESS: 0
ABDOMINAL PAIN: 0
SHORTNESS OF BREATH: 0
BLOOD IN STOOL: 0

## 2020-02-10 ENCOUNTER — TELEPHONE (OUTPATIENT)
Dept: FAMILY MEDICINE CLINIC | Age: 75
End: 2020-02-10

## 2020-02-10 LAB
CONTROL: PRESENT
HEMOCCULT STL QL: NEGATIVE

## 2020-02-10 PROCEDURE — 82274 ASSAY TEST FOR BLOOD FECAL: CPT | Performed by: FAMILY MEDICINE

## 2020-02-20 RX ORDER — LISINOPRIL AND HYDROCHLOROTHIAZIDE 12.5; 1 MG/1; MG/1
TABLET ORAL
Qty: 90 TABLET | Refills: 0 | Status: SHIPPED | OUTPATIENT
Start: 2020-02-20 | End: 2020-05-29

## 2020-03-04 RX ORDER — ATORVASTATIN CALCIUM 10 MG/1
TABLET, FILM COATED ORAL
Qty: 90 TABLET | Refills: 0 | Status: SHIPPED | OUTPATIENT
Start: 2020-03-04 | End: 2020-10-19 | Stop reason: SDUPTHER

## 2020-03-06 ENCOUNTER — APPOINTMENT (OUTPATIENT)
Dept: CT IMAGING | Age: 75
End: 2020-03-06
Payer: MEDICARE

## 2020-03-06 ENCOUNTER — HOSPITAL ENCOUNTER (EMERGENCY)
Age: 75
Discharge: HOME | End: 2020-03-06
Attending: EMERGENCY MEDICINE
Payer: MEDICARE

## 2020-03-06 VITALS
OXYGEN SATURATION: 94 % | WEIGHT: 139 LBS | DIASTOLIC BLOOD PRESSURE: 53 MMHG | HEART RATE: 93 BPM | RESPIRATION RATE: 16 BRPM | HEIGHT: 57 IN | TEMPERATURE: 98.2 F | SYSTOLIC BLOOD PRESSURE: 125 MMHG | BODY MASS INDEX: 29.99 KG/M2

## 2020-03-06 LAB
-: ABNORMAL
ABSOLUTE EOS #: 0 K/UL (ref 0–0.4)
ABSOLUTE IMMATURE GRANULOCYTE: ABNORMAL K/UL (ref 0–0.3)
ABSOLUTE LYMPH #: 0.6 K/UL (ref 1–4.8)
ABSOLUTE MONO #: 0.5 K/UL (ref 0.1–1.2)
ALBUMIN SERPL-MCNC: 4.3 G/DL (ref 3.5–5.2)
ALBUMIN/GLOBULIN RATIO: 1.3 (ref 1–2.5)
ALP BLD-CCNC: 64 U/L (ref 35–104)
ALT SERPL-CCNC: 31 U/L (ref 5–33)
AMORPHOUS: ABNORMAL
ANION GAP SERPL CALCULATED.3IONS-SCNC: 14 MMOL/L (ref 9–17)
AST SERPL-CCNC: 26 U/L
BACTERIA: ABNORMAL
BASOPHILS # BLD: 0 % (ref 0–2)
BASOPHILS ABSOLUTE: 0 K/UL (ref 0–0.2)
BILIRUB SERPL-MCNC: 0.28 MG/DL (ref 0.3–1.2)
BILIRUBIN DIRECT: <0.08 MG/DL
BILIRUBIN URINE: NEGATIVE
BILIRUBIN, INDIRECT: ABNORMAL MG/DL (ref 0–1)
BUN BLDV-MCNC: 14 MG/DL (ref 8–23)
BUN/CREAT BLD: ABNORMAL (ref 9–20)
CALCIUM SERPL-MCNC: 9.7 MG/DL (ref 8.6–10.4)
CASTS UA: ABNORMAL /LPF
CHLORIDE BLD-SCNC: 100 MMOL/L (ref 98–107)
CO2: 25 MMOL/L (ref 20–31)
COLOR: YELLOW
COMMENT UA: ABNORMAL
CREAT SERPL-MCNC: 0.67 MG/DL (ref 0.5–0.9)
CRYSTALS, UA: ABNORMAL /HPF
DIFFERENTIAL TYPE: ABNORMAL
EOSINOPHILS RELATIVE PERCENT: 0 % (ref 1–4)
EPITHELIAL CELLS UA: ABNORMAL /HPF (ref 0–5)
GFR AFRICAN AMERICAN: >60 ML/MIN
GFR NON-AFRICAN AMERICAN: >60 ML/MIN
GFR SERPL CREATININE-BSD FRML MDRD: ABNORMAL ML/MIN/{1.73_M2}
GFR SERPL CREATININE-BSD FRML MDRD: ABNORMAL ML/MIN/{1.73_M2}
GLOBULIN: ABNORMAL G/DL (ref 1.5–3.8)
GLUCOSE BLD-MCNC: 175 MG/DL (ref 70–99)
GLUCOSE URINE: NEGATIVE
HCT VFR BLD CALC: 43 % (ref 36–46)
HEMOGLOBIN: 14.2 G/DL (ref 12–16)
IMMATURE GRANULOCYTES: ABNORMAL %
KETONES, URINE: NEGATIVE
LEUKOCYTE ESTERASE, URINE: ABNORMAL
LIPASE: 25 U/L (ref 13–60)
LYMPHOCYTES # BLD: 4 % (ref 24–44)
MAGNESIUM: 1.9 MG/DL (ref 1.6–2.6)
MCH RBC QN AUTO: 29.2 PG (ref 26–34)
MCHC RBC AUTO-ENTMCNC: 33 G/DL (ref 31–37)
MCV RBC AUTO: 88.7 FL (ref 80–100)
MONOCYTES # BLD: 4 % (ref 2–11)
MUCUS: ABNORMAL
NITRITE, URINE: NEGATIVE
NRBC AUTOMATED: ABNORMAL PER 100 WBC
OTHER OBSERVATIONS UA: ABNORMAL
PDW BLD-RTO: 13.7 % (ref 12.5–15.4)
PH UA: 5.5 (ref 5–8)
PLATELET # BLD: 295 K/UL (ref 140–450)
PLATELET ESTIMATE: ABNORMAL
PMV BLD AUTO: 9.2 FL (ref 6–12)
POTASSIUM SERPL-SCNC: 3.2 MMOL/L (ref 3.7–5.3)
PROTEIN UA: ABNORMAL
RBC # BLD: 4.85 M/UL (ref 4–5.2)
RBC # BLD: ABNORMAL 10*6/UL
RBC UA: ABNORMAL /HPF (ref 0–2)
RENAL EPITHELIAL, UA: ABNORMAL /HPF
SEG NEUTROPHILS: 92 % (ref 36–66)
SEGMENTED NEUTROPHILS ABSOLUTE COUNT: 13.2 K/UL (ref 1.8–7.7)
SODIUM BLD-SCNC: 139 MMOL/L (ref 135–144)
SPECIFIC GRAVITY UA: 1.02 (ref 1–1.03)
TOTAL PROTEIN: 7.7 G/DL (ref 6.4–8.3)
TRICHOMONAS: ABNORMAL
TURBIDITY: ABNORMAL
URINE HGB: ABNORMAL
UROBILINOGEN, URINE: NORMAL
WBC # BLD: 14.4 K/UL (ref 3.5–11)
WBC # BLD: ABNORMAL 10*3/UL
WBC UA: ABNORMAL /HPF (ref 0–5)
YEAST: ABNORMAL

## 2020-03-06 PROCEDURE — 36415 COLL VENOUS BLD VENIPUNCTURE: CPT

## 2020-03-06 PROCEDURE — 96375 TX/PRO/DX INJ NEW DRUG ADDON: CPT

## 2020-03-06 PROCEDURE — 6360000004 HC RX CONTRAST MEDICATION: Performed by: EMERGENCY MEDICINE

## 2020-03-06 PROCEDURE — 2580000003 HC RX 258: Performed by: EMERGENCY MEDICINE

## 2020-03-06 PROCEDURE — 80048 BASIC METABOLIC PNL TOTAL CA: CPT

## 2020-03-06 PROCEDURE — 96361 HYDRATE IV INFUSION ADD-ON: CPT

## 2020-03-06 PROCEDURE — 99284 EMERGENCY DEPT VISIT MOD MDM: CPT

## 2020-03-06 PROCEDURE — 81001 URINALYSIS AUTO W/SCOPE: CPT

## 2020-03-06 PROCEDURE — 85025 COMPLETE CBC W/AUTO DIFF WBC: CPT

## 2020-03-06 PROCEDURE — 6370000000 HC RX 637 (ALT 250 FOR IP): Performed by: SPECIALIST

## 2020-03-06 PROCEDURE — 74177 CT ABD & PELVIS W/CONTRAST: CPT

## 2020-03-06 PROCEDURE — 6360000002 HC RX W HCPCS: Performed by: EMERGENCY MEDICINE

## 2020-03-06 PROCEDURE — 96374 THER/PROPH/DIAG INJ IV PUSH: CPT

## 2020-03-06 PROCEDURE — 83690 ASSAY OF LIPASE: CPT

## 2020-03-06 PROCEDURE — 80076 HEPATIC FUNCTION PANEL: CPT

## 2020-03-06 PROCEDURE — 87086 URINE CULTURE/COLONY COUNT: CPT

## 2020-03-06 PROCEDURE — 83735 ASSAY OF MAGNESIUM: CPT

## 2020-03-06 RX ORDER — 0.9 % SODIUM CHLORIDE 0.9 %
80 INTRAVENOUS SOLUTION INTRAVENOUS ONCE
Status: COMPLETED | OUTPATIENT
Start: 2020-03-06 | End: 2020-03-06

## 2020-03-06 RX ORDER — SULFAMETHOXAZOLE AND TRIMETHOPRIM 200; 40 MG/5ML; MG/5ML
160 SUSPENSION ORAL 2 TIMES DAILY
Qty: 280 ML | Refills: 0 | Status: SHIPPED | OUTPATIENT
Start: 2020-03-06 | End: 2020-03-13

## 2020-03-06 RX ORDER — MORPHINE SULFATE 2 MG/ML
2 INJECTION, SOLUTION INTRAMUSCULAR; INTRAVENOUS ONCE
Status: COMPLETED | OUTPATIENT
Start: 2020-03-06 | End: 2020-03-06

## 2020-03-06 RX ORDER — ONDANSETRON 2 MG/ML
4 INJECTION INTRAMUSCULAR; INTRAVENOUS ONCE
Status: COMPLETED | OUTPATIENT
Start: 2020-03-06 | End: 2020-03-06

## 2020-03-06 RX ORDER — SULFAMETHOXAZOLE AND TRIMETHOPRIM 200; 40 MG/5ML; MG/5ML
160 SUSPENSION ORAL ONCE
Status: COMPLETED | OUTPATIENT
Start: 2020-03-06 | End: 2020-03-06

## 2020-03-06 RX ORDER — SODIUM CHLORIDE 0.9 % (FLUSH) 0.9 %
10 SYRINGE (ML) INJECTION PRN
Status: DISCONTINUED | OUTPATIENT
Start: 2020-03-06 | End: 2020-03-06 | Stop reason: HOSPADM

## 2020-03-06 RX ORDER — 0.9 % SODIUM CHLORIDE 0.9 %
1000 INTRAVENOUS SOLUTION INTRAVENOUS ONCE
Status: COMPLETED | OUTPATIENT
Start: 2020-03-06 | End: 2020-03-06

## 2020-03-06 RX ADMIN — SODIUM CHLORIDE 1000 ML: 9 INJECTION, SOLUTION INTRAVENOUS at 06:58

## 2020-03-06 RX ADMIN — POTASSIUM BICARBONATE 20 MEQ: 782 TABLET, EFFERVESCENT ORAL at 08:59

## 2020-03-06 RX ADMIN — IOVERSOL 75 ML: 741 INJECTION INTRA-ARTERIAL; INTRAVENOUS at 07:27

## 2020-03-06 RX ADMIN — Medication 10 ML: at 07:27

## 2020-03-06 RX ADMIN — MORPHINE SULFATE 2 MG: 2 INJECTION, SOLUTION INTRAMUSCULAR; INTRAVENOUS at 06:58

## 2020-03-06 RX ADMIN — SODIUM CHLORIDE 80 ML: 9 INJECTION, SOLUTION INTRAVENOUS at 07:27

## 2020-03-06 RX ADMIN — SULFAMETHOXAZOLE AND TRIMETHOPRIM 20 ML: 200; 40 SUSPENSION ORAL at 08:59

## 2020-03-06 RX ADMIN — ONDANSETRON 4 MG: 2 INJECTION INTRAMUSCULAR; INTRAVENOUS at 06:58

## 2020-03-06 ASSESSMENT — PAIN DESCRIPTION - ORIENTATION: ORIENTATION: RIGHT;LOWER

## 2020-03-06 ASSESSMENT — PAIN DESCRIPTION - PAIN TYPE: TYPE: ACUTE PAIN

## 2020-03-06 ASSESSMENT — PAIN DESCRIPTION - LOCATION: LOCATION: ABDOMEN

## 2020-03-06 ASSESSMENT — PAIN SCALES - GENERAL
PAINLEVEL_OUTOF10: 9
PAINLEVEL_OUTOF10: 9
PAINLEVEL_OUTOF10: 2

## 2020-03-06 ASSESSMENT — PAIN DESCRIPTION - PROGRESSION: CLINICAL_PROGRESSION: GRADUALLY IMPROVING

## 2020-03-06 ASSESSMENT — PAIN DESCRIPTION - DESCRIPTORS: DESCRIPTORS: CRAMPING;CONSTANT

## 2020-03-06 NOTE — ED PROVIDER NOTES
Medications    ATORVASTATIN (LIPITOR) 10 MG TABLET    TAKE 1 TABLET BY MOUTH ONE TIME A DAY     LISINOPRIL-HYDROCHLOROTHIAZIDE (PRINZIDE;ZESTORETIC) 10-12.5 MG PER TABLET    TAKE 1 TABLET BY MOUTH ONE TIME A DAY        ALLERGIES     has No Known Allergies. FAMILY HISTORY     She indicated that her mother is . family history includes High Blood Pressure in her mother. SOCIAL HISTORY      reports that she has never smoked. She has never used smokeless tobacco. She reports that she does not drink alcohol. I counseled the patient against using tobacco products. PHYSICAL EXAM     INITIAL VITALS:  height is 4' 9\" (1.448 m) and weight is 63 kg (139 lb). Her oral temperature is 98.2 °F (36.8 °C). Her blood pressure is 161/72 (abnormal) and her pulse is 93. Her respiration is 16 and oxygen saturation is 94%. CONSTITUTIONAL: Appears uncomfortable, nontoxic  SKIN: warm, dry, no jaundice, hives or petechiae  EYES: clear conjunctiva, non-icteric sclera  HENT: normocephalic, atraumatic, dry mucus membranes  NECK: Nontender and supple with no nuchal rigidity, full range of motion  PULMONARY: clear to auscultation without wheezes, rhonchi, or rales, normal excursion, no accessory muscle use and no stridor  CARDIOVASCULAR: regular rate, rhythm. Strong radial pulses with intact distal perfusion. Capillary refill <2 seconds. GASTROINTESTINAL: soft, right lower quadrant tenderness to palpation, negative Brown sign, pain over McBurney's point, negative Rovsing, negative heeltap, negative obturator sign, non-distended, no palpable masses, no rebound or guarding   GENITOURINARY: No costovertebral angle tenderness to palpation  MUSCULOSKELETAL: No midline spinal tenderness, step off or deformity. Extremities are otherwise nontender to palpation and nonerythematous. Compartments soft. No peripheral edema. NEUROLOGIC: alert and oriented x 3, GCS 15, normal mentation and speech.  Moves all extremities x 4 without motor or sensory deficit, gait is stable without ataxia  PSYCHIATRIC: normal mood and affect, thought process is clear and linear    DIAGNOSTIC RESULTS     EKG:  None    RADIOLOGY:   CT abdomen pelvis pending    LABS:  pending    EMERGENCY DEPARTMENT COURSE:        The patient was given the following medications:  Orders Placed This Encounter   Medications    0.9 % sodium chloride bolus    ondansetron (ZOFRAN) injection 4 mg    morphine (PF) injection 2 mg        Vitals:    Vitals:    03/06/20 0640   BP: (!) 161/72   Pulse: 93   Resp: 16   Temp: 98.2 °F (36.8 °C)   TempSrc: Oral   SpO2: 94%   Weight: 63 kg (139 lb)   Height: 4' 9\" (1.448 m)     -------------------------  BP: (!) 161/72(Pt has not taken BP medications last night), Temp: 98.2 °F (36.8 °C), Pulse: 93, Resp: 16    CONSULTS:  None    CRITICAL CARE:   None    PROCEDURES:  None    DIAGNOSIS/ MDM:   Susan Burnette is a 69 y.o. female who presents with lower quadrant abdominal pain. Vital signs are stable. Heart regular rate and rhythm. Lungs clear to auscultation. Abdomen soft with right lower quadrant tenderness to palpation. No CVA tenderness. Mucous membranes moist.  Capillary refill less than 2 seconds. CBC, BMP, LFTs, lipase, urinalysis, and CT abdomen pelvis are pending. The patient has been ordered morphine, Zofran and IV fluids. The patient was signed out to Dr. Milena Machado. FINAL IMPRESSION      1.  Abdominal pain, right lower quadrant          DISPOSITION/PLAN   DISPOSITION  pending    (Please note that portions of this note were completed with a voice recognitionprogram.  Efforts were made to edit the dictations but occasionally words are mis-transcribed.)    [address]  Emergency Physician Attending          Grady Duty, DO  03/06/20 0700

## 2020-03-06 NOTE — ED PROVIDER NOTES
ADDENDUM:      Care of this patient was assumed from Dr. Bailey. The patient was seen for Abdominal Pain (started at midnight last night. one episode of diarrhea as well) and Nausea  . The patient's initial evaluation and plan have been discussed with the prior provider who initially evaluated the patient. Nursing Notes, Past Medical Hx, Past Surgical Hx, Social Hx, Allergies, and Family Hx were all reviewed. Diagnostic Results     EKG   None obtained    RADIOLOGY:   Non-plain film images such as CT, Ultrasound and MRI are read by the radiologist. [facility] radiographic images are visualized and the radiologist interpretations are reviewed as follows:     Ct Abdomen Pelvis W Iv Contrast    Result Date: 3/6/2020  EXAMINATION: CT OF THE ABDOMEN AND PELVIS WITH CONTRAST 3/6/2020 7:16 am TECHNIQUE: CT of the abdomen and pelvis was performed with the administration of intravenous contrast. Multiplanar reformatted images are provided for review. Dose modulation, iterative reconstruction, and/or weight based adjustment of the mA/kV was utilized to reduce the radiation dose to as low as reasonably achievable. COMPARISON: None. HISTORY: ORDERING SYSTEM PROVIDED HISTORY: RLQ pain TECHNOLOGIST PROVIDED HISTORY: IV Only Contrast RLQ pain Reason for Exam: nausea Acuity: Acute Type of Exam: Initial Additional signs and symptoms: rt side abd pain Relevant Medical/Surgical History: sx hysterectomy FINDINGS: Lower Chest: No acute findings. Organs: Cholelithiasis in a contracted gallbladder. No evidence for acute cholecystitis or biliary dilatation. The liver, pancreas, spleen and adrenals reveal no acute findings. Bilateral adrenal nodules measuring 13 mm on the right and 18 mm on the left are noted, indeterminate on this exam. Mild right hydronephrosis, perinephric stranding and urothelial enhancement is noted.   There is no ureteral stone identified, however there are calcifications layering than the bladder, which may represent recently passed stone. The left kidney reveals no acute findings. GI/Bowel: There is no bowel dilatation or wall thickening identified. No evidence for acute appendicitis. Scattered diverticulosis. Pelvis: No acute findings. Status post hysterectomy. Mild pelvic floor relaxation with inferior displacement of the bladder and rectum. [place] Peritoneum/Retroperitoneum: No free air or free fluid. The aorta is normal in caliber. The visceral branches are patent. Calcified atheromatous plaque is present. No lymphadenopathy. Bones/Soft Tissues: Advanced multilevel degenerative disc disease and facet arthropathy. Mild joint space narrowing in the hips. 1.  Mild right hydronephrosis and perinephric stranding. No obstructing ureteral stone identified, however small layering stones are present in the bladder, which may represent recently passed stone. Underlying pyelonephritis should be considered in the appropriate clinical setting. 2.  Cholelithiasis. 3.  Diverticulosis. 4.  Pelvic floor relaxation.      LABS:   Results for orders placed or performed during the hospital encounter of 03/06/20   CBC Auto Differential   Result Value Ref Range    WBC 14.4 (H) 3.5 - 11.0 k/uL    RBC 4.85 4.0 - 5.2 m/uL    Hemoglobin 14.2 12.0 - 16.0 g/dL    Hematocrit 43.0 36 - 46 %    MCV 88.7 80 - 100 fL    MCH 29.2 26 - 34 pg    MCHC 33.0 31 - 37 g/dL    RDW 13.7 12.5 - 15.4 %    Platelets 466 392 - 381 k/uL    MPV 9.2 6.0 - 12.0 fL    NRBC Automated NOT REPORTED per 100 WBC    Differential Type NOT REPORTED     Seg Neutrophils 92 (H) 36 - 66 %    Lymphocytes 4 (L) 24 - 44 %    Monocytes 4 2 - 11 %    Eosinophils % 0 (L) 1 - 4 %    Basophils 0 0 - 2 %    Immature Granulocytes NOT REPORTED 0 %    Segs Absolute 13.20 (H) 1.8 - 7.7 k/uL    Absolute Lymph # 0.60 (L) 1.0 - 4.8 k/uL    Absolute Mono # 0.50 0.1 - 1.2 k/uL    Absolute Eos # 0.00 0.0 - 0.4 k/uL    Basophils Absolute 0.00 0.0 - 0.2 k/uL    Absolute Immature Granulocyte NOT REPORTED 0.00 - 0.30 k/uL    WBC Morphology NOT REPORTED     RBC Morphology NOT REPORTED     Platelet Estimate NOT REPORTED    Basic Metabolic Panel w/ Reflex to MG   Result Value Ref Range    Glucose 175 (H) 70 - 99 mg/dL    BUN 14 8 - 23 mg/dL    CREATININE 0.67 0.50 - 0.90 mg/dL    Bun/Cre Ratio NOT REPORTED 9 - 20    Calcium 9.7 8.6 - 10.4 mg/dL    Sodium 139 135 - 144 mmol/L    Potassium 3.2 (L) 3.7 - 5.3 mmol/L    Chloride 100 98 - 107 mmol/L    CO2 25 20 - 31 mmol/L    Anion Gap 14 9 - 17 mmol/L    GFR Non-African American >60 >60 mL/min    GFR African American >60 >60 mL/min    GFR Comment          GFR Staging NOT REPORTED    Hepatic Function Panel   Result Value Ref Range    Alb 4.3 3.5 - 5.2 g/dL    Alkaline Phosphatase 64 35 - 104 U/L    ALT 31 5 - 33 U/L    AST 26 <32 U/L    Total Bilirubin 0.28 (L) 0.3 - 1.2 mg/dL    Bilirubin, Direct <0.08 <0.31 mg/dL    Bilirubin, Indirect CANNOT BE CALCULATED 0.00 - 1.00 mg/dL    Total Protein 7.7 6.4 - 8.3 g/dL    Globulin NOT REPORTED 1.5 - 3.8 g/dL    Albumin/Globulin Ratio 1.3 1.0 - 2.5   Lipase   Result Value Ref Range    Lipase 25 13 - 60 U/L   Urinalysis Reflex to Culture   Result Value Ref Range    Color, UA YELLOW YELLOW    Turbidity UA SLIGHTLY CLOUDY (A) CLEAR    Glucose, Ur NEGATIVE NEGATIVE    Bilirubin Urine NEGATIVE NEGATIVE    Ketones, Urine NEGATIVE NEGATIVE    Specific Gravity, UA 1.024 1.005 - 1.030    Urine Hgb LARGE (A) NEGATIVE    pH, UA 5.5 5.0 - 8.0    Protein, UA TRACE (A) NEGATIVE    Urobilinogen, Urine Normal Normal    Nitrite, Urine NEGATIVE NEGATIVE    Leukocyte Esterase, Urine LARGE (A) NEGATIVE    Urinalysis Comments NOT REPORTED    Microscopic Urinalysis   Result Value Ref Range    -          WBC, UA 20 TO 50 0 - 5 /HPF    RBC, UA 10 TO 20 0 - 2 /HPF    Casts UA NOT REPORTED /LPF    Crystals, UA NOT REPORTED None /HPF    Epithelial Cells UA 10 TO 20 0 - 5 /HPF    Renal Epithelial, UA NOT REPORTED 0 /HPF    Bacteria, UA MODERATE (A) None    Mucus, UA NOT REPORTED None    Trichomonas, UA NOT REPORTED None    Amorphous, UA 1+ (A) None    Other Observations UA Culture ordered based on defined criteria. (A) NOT REQ. Yeast, UA FEW (A) None   Magnesium   Result Value Ref Range    Magnesium 1.9 1.6 - 2.6 mg/dL       RECENT VITALS:  BP: (!) 161/72(Pt has not taken BP medications last night), Temp: 98.2 °F (36.8 °C), Pulse: 93, Resp: 16     ED Course     The patient was given the following medications:  Orders Placed This Encounter   Medications    0.9 % sodium chloride bolus    ondansetron (ZOFRAN) injection 4 mg    morphine (PF) injection 2 mg    0.9 % sodium chloride bolus    ioversol (OPTIRAY) 74 % injection 75 mL    sodium chloride flush 0.9 % injection 10 mL    potassium bicarb-citric acid (EFFER-K) effervescent tablet 20 mEq    sulfamethoxazole-trimethoprim (BACTRIM;SEPTRA) 200-40 MG/5ML suspension 20 mL    sulfamethoxazole-trimethoprim (BACTRIM;SEPTRA) 200-40 MG/5ML suspension     Sig: Take 20 mLs by mouth 2 times daily for 7 days     Dispense:  280 mL     Refill:  0     During the emergency department course, patient was given normal saline bolus followed by infusion as well as morphine 2 mg and Zofran 4 mg IV push. Patient is feeling much better and resting comfortably. Her abdominal pain, flank pain and nausea are all resolved. Upon reevaluation, her abdomen is soft and nontender with active bowel sounds. Medical Decision Making      57-year-old female patient presents with right flank pain radiating to the right lower quadrant abdominal area since 12 AM this morning. She had some nausea and dry heaves. She denies any history of kidney stones in the past.  She denies any chest pain, lightheadedness or dizziness and also denies any urinary symptoms. Upon reevaluation, patient is afebrile and vital signs are stable. Her abdominal pain and flank pain are resolved. Lungs are clear to auscultation.   Rhythm is regular without murmurs. Abdomen is soft and nontender with active bowel sounds. Her work-up reveals pyelonephritis, hypokalemia and probably recently passed stone into the bladder. Patient prefers to go home. She was given potassium chloride 20 mEq orally and Bactrim suspension 20 mL orally. Plan is to discharge the patient on Bactrim twice daily for 7 days. She is advised take Tylenol and ibuprofen as needed for the pain, follow-up with PCP, return if worse. Disposition     FINAL IMPRESSION      1. Abdominal pain, right lower quadrant    2. Acute pyelonephritis    3. Kidney stone          DISPOSITION/PLAN   DISPOSITION Decision To Discharge 03/06/2020 08:34:34 AM      PATIENT REFERRED TO:  Jimi Durbin MD  [address]  [phone]    Call in 2 days  For reevaluation of current symptoms    [facility] ED  [address] [facility] [place] [address]  [phone]    If symptoms worsen      DISCHARGE MEDICATIONS:  New Prescriptions    SULFAMETHOXAZOLE-TRIMETHOPRIM (BACTRIM;SEPTRA) 200-40 MG/5ML SUSPENSION    Take 20 mLs by mouth 2 times daily for 7 days             (Please note that portions of this note were completed with a voice recognition program.  Efforts were made to edit the dictations but occasionally words are mis-transcribed.)    Friemdan MD, F.A.C.E.P.   Attending Emergency Medicine Physician               Marquita Kent MD  03/06/20 [id number]

## 2020-03-06 NOTE — ED NOTES
Pt denies needs, call light within reach, pt et family updated awaiting CT result.      Az Sigala RN  03/06/20 [id number]

## 2020-03-06 NOTE — ED NOTES
Pt presents to ED with complaint of right sided abdominal pain that started at midnight. Pt states pain is cramping in nature. Nothing she does makes the pain better or worse. She reports that pain is now across entire lower abdomen, relates it to feeling like gas pain. Pt also states nauseous but not vomiting. She had one episode of diarrhea at midnight but denies any blood in stool. She denies any problems with urination. Denies CP or SOB. Pt is speaking in complete sentences without difficulty. Resp are clear and unlabored.  She shows no sign of distress     Karin Torres RN  03/06/20 [id number]

## 2020-03-07 LAB
CULTURE: NORMAL
Lab: NORMAL
SPECIMEN DESCRIPTION: NORMAL

## 2020-05-29 RX ORDER — LISINOPRIL AND HYDROCHLOROTHIAZIDE 12.5; 1 MG/1; MG/1
TABLET ORAL
Qty: 90 TABLET | Refills: 0 | Status: SHIPPED | OUTPATIENT
Start: 2020-05-29 | End: 2020-10-19 | Stop reason: SDUPTHER

## 2020-06-09 ENCOUNTER — OFFICE VISIT (OUTPATIENT)
Dept: FAMILY MEDICINE CLINIC | Age: 75
End: 2020-06-09
Payer: MEDICARE

## 2020-06-09 VITALS
WEIGHT: 137 LBS | BODY MASS INDEX: 29.65 KG/M2 | DIASTOLIC BLOOD PRESSURE: 80 MMHG | TEMPERATURE: 97.8 F | HEART RATE: 88 BPM | SYSTOLIC BLOOD PRESSURE: 134 MMHG | RESPIRATION RATE: 16 BRPM

## 2020-06-09 PROCEDURE — 1090F PRES/ABSN URINE INCON ASSESS: CPT | Performed by: FAMILY MEDICINE

## 2020-06-09 PROCEDURE — 3017F COLORECTAL CA SCREEN DOC REV: CPT | Performed by: FAMILY MEDICINE

## 2020-06-09 PROCEDURE — G8400 PT W/DXA NO RESULTS DOC: HCPCS | Performed by: FAMILY MEDICINE

## 2020-06-09 PROCEDURE — 1036F TOBACCO NON-USER: CPT | Performed by: FAMILY MEDICINE

## 2020-06-09 PROCEDURE — 99214 OFFICE O/P EST MOD 30 MIN: CPT | Performed by: FAMILY MEDICINE

## 2020-06-09 PROCEDURE — G8417 CALC BMI ABV UP PARAM F/U: HCPCS | Performed by: FAMILY MEDICINE

## 2020-06-09 PROCEDURE — G8427 DOCREV CUR MEDS BY ELIG CLIN: HCPCS | Performed by: FAMILY MEDICINE

## 2020-06-09 PROCEDURE — 1123F ACP DISCUSS/DSCN MKR DOCD: CPT | Performed by: FAMILY MEDICINE

## 2020-06-09 PROCEDURE — 4040F PNEUMOC VAC/ADMIN/RCVD: CPT | Performed by: FAMILY MEDICINE

## 2020-06-09 RX ORDER — MECLIZINE HCL 12.5 MG/1
12.5 TABLET ORAL 3 TIMES DAILY PRN
Qty: 20 TABLET | Refills: 0 | Status: SHIPPED | OUTPATIENT
Start: 2020-06-09 | End: 2020-06-19

## 2020-06-09 SDOH — ECONOMIC STABILITY: FOOD INSECURITY: WITHIN THE PAST 12 MONTHS, THE FOOD YOU BOUGHT JUST DIDN'T LAST AND YOU DIDN'T HAVE MONEY TO GET MORE.: NEVER TRUE

## 2020-06-09 SDOH — ECONOMIC STABILITY: INCOME INSECURITY: HOW HARD IS IT FOR YOU TO PAY FOR THE VERY BASICS LIKE FOOD, HOUSING, MEDICAL CARE, AND HEATING?: NOT HARD AT ALL

## 2020-06-09 SDOH — ECONOMIC STABILITY: FOOD INSECURITY: WITHIN THE PAST 12 MONTHS, YOU WORRIED THAT YOUR FOOD WOULD RUN OUT BEFORE YOU GOT MONEY TO BUY MORE.: NEVER TRUE

## 2020-06-09 ASSESSMENT — PATIENT HEALTH QUESTIONNAIRE - PHQ9
2. FEELING DOWN, DEPRESSED OR HOPELESS: 0
1. LITTLE INTEREST OR PLEASURE IN DOING THINGS: 0
SUM OF ALL RESPONSES TO PHQ QUESTIONS 1-9: 0
SUM OF ALL RESPONSES TO PHQ QUESTIONS 1-9: 0
SUM OF ALL RESPONSES TO PHQ9 QUESTIONS 1 & 2: 0

## 2020-06-09 ASSESSMENT — ENCOUNTER SYMPTOMS
CHEST TIGHTNESS: 0
ABDOMINAL PAIN: 0
BLOOD IN STOOL: 0
SHORTNESS OF BREATH: 0

## 2020-06-09 NOTE — PROGRESS NOTES
Subjective:      Patient ID: Tucker Flores is a 70 y.o. female. HPI  Visit Information    Have you changed or started any medications since your last visit including any over-the-counter medicines, vitamins, or herbal medicines? no   Have you stopped taking any of your medications? Is so, why? -  no  Are you having any side effects from any of your medications? - no    Have you seen any other physician or provider since your last visit?  no   Have you had any other diagnostic tests since your last visit? yes - LABS DONE ON 3/6/20  BRITT Guillermokimberley Lebron, DO  Have you been seen in the emergency room and/or had an admission in a hospital since we last saw you?  no   Have you had your routine dental cleaning in the past 6 months?  no     Do you have an active MyChart account? If no, what is the barrier?   Yes    Patient Care Team:  Nina Cantu MD as PCP - General (Family Medicine)  Nina Cantu MD as PCP - [facility] Empaneled Provider  Cathi Chadwick (Neurosurgery)  Patrizia Brown MD (Dermatology)  Nina Cantu MD as Referring Physician (Family Medicine)    Medical History Review  Past Medical, Family, and Social History reviewed and does not contribute to the patient presenting condition    Health Maintenance   Topic Date Due    Hepatitis C screen  1945    DTaP/Tdap/Td vaccine (1 - Tdap) 06/01/1966    Shingles Vaccine (1 of 2) 06/01/1997    Breast cancer screen  11/21/2016    Annual Wellness Visit (AWV)  05/29/2019    Flu vaccine (Season Ended) 11/22/2020 (Originally 9/1/2020)    Pneumococcal 65+ years Vaccine (1 of 1 - PPSV23) 04/19/2024 (Originally 6/1/2012)    A1C test (Diabetic or Prediabetic)  11/18/2020    Lipid screen  11/18/2020    Colon Cancer Screen FIT/FOBT  02/10/2021    Potassium monitoring  03/06/2021    Creatinine monitoring  03/06/2021    DEXA (modify frequency per FRAX score)  Addressed    Hepatitis A vaccine  Aged Out    Hepatitis B vaccine  Aged Out    Hib vaccine  Aged Out    Meningococcal (ACWY) vaccine  Aged Out         Patient is a 72-year-old white female who presents for hypertension, hyperlipidemia, impaired fasting glucose, hypokalemia. Patient also states that about 3 days ago she awoke with right-sided hearing loss and dizziness. She states that sometimes when she moves her head the surroundings spin. She denies any fever, chills, chest pain, abdominal pain, shortness of breath. She states she is taking and tolerating her routine medication. She states she has a good appetite and tries to remain active. Review of Systems   Constitutional: Negative for chills and fever. HENT: Positive for hearing loss (  Right ear). Negative for congestion. Respiratory: Negative for chest tightness and shortness of breath. Cardiovascular: Negative for chest pain. Gastrointestinal: Negative for abdominal pain and blood in stool. Genitourinary: Negative for dysuria and hematuria. Skin: Negative for rash. Neurological: Positive for dizziness. Negative for headaches. Psychiatric/Behavioral: Negative for confusion and dysphoric mood. Objective:   Physical Exam  Vitals signs and nursing note reviewed. Constitutional:       General: She is not in acute distress. Appearance: She is well-developed. HENT:      Head: Normocephalic and atraumatic. Right Ear: Ear canal and external ear normal. Decreased hearing noted. Tympanic membrane is bulging. Tympanic membrane is not erythematous. Left Ear: Tympanic membrane, ear canal and external ear normal.      Nose: Nose normal.      Mouth/Throat:      Mouth: Mucous membranes are moist.      Pharynx: Oropharynx is clear. Eyes:      General: No scleral icterus. Right eye: No discharge. Left eye: No discharge. Extraocular Movements: Extraocular movements intact. Conjunctiva/sclera: Conjunctivae normal.      Pupils: Pupils are equal, round, and reactive to light. Refill:  0         Patient referred to ENT for vertigo and hearing loss  Continue routine medication  Follow-up in 1 to 2 months

## 2020-07-23 ENCOUNTER — TELEPHONE (OUTPATIENT)
Dept: FAMILY MEDICINE CLINIC | Age: 75
End: 2020-07-23

## 2020-07-23 NOTE — TELEPHONE ENCOUNTER
The patient is going to get her labs done for her appointment with you on 07/31/20 but she started to have urinary urgency last week and she wondered if you could order a UA AND C&S so can get that done tomorrow with the rest of her labs. She would like the orders faxed to the [facility]. ([phone])  Please advise.

## 2020-07-24 LAB
ALT SERPL-CCNC: 20 U/L
AST SERPL-CCNC: 17 U/L
AVERAGE GLUCOSE: 123
BUN BLDV-MCNC: 10 MG/DL
CALCIUM SERPL-MCNC: 9.2 MG/DL
CHLORIDE BLD-SCNC: 100 MMOL/L
CHOLESTEROL, TOTAL: 137 MG/DL
CHOLESTEROL/HDL RATIO: 3.1
CO2: 30 MMOL/L
CREAT SERPL-MCNC: 0.72 MG/DL
GFR CALCULATED: >60
GLUCOSE BLD-MCNC: 99 MG/DL
HBA1C MFR BLD: 5.9 %
HDLC SERPL-MCNC: 44 MG/DL (ref 35–70)
LDL CHOLESTEROL CALCULATED: 75 MG/DL (ref 0–160)
MAGNESIUM: 2.1 MG/DL
POTASSIUM SERPL-SCNC: 3.6 MMOL/L
SODIUM BLD-SCNC: 1369 MMOL/L
TRIGL SERPL-MCNC: 92 MG/DL
VLDLC SERPL CALC-MCNC: 18 MG/DL

## 2020-07-25 LAB
APPEARANCE: ABNORMAL
BILIRUBIN: NEGATIVE
COLOR: YELLOW
GLUCOSE BLD-MCNC: NEGATIVE MG/DL
KETONES, URINE: NEGATIVE MG/DL
LEUKOCYTE ESTERASE, URINE: ABNORMAL
NITRITE, URINE: NEGATIVE
OCCULT BLOOD,URINE: NEGATIVE
OTHER MICROSCOPIC ELEMENTS: ABNORMAL
PH: 5.5 (ref 5–8.5)
PROTEIN, URINE: NEGATIVE MG/DL
RBC: 3 /HPF (ref 0–5)
REPORT STATUS: NORMAL
SITE/TYPE: ABNORMAL
SITE/TYPE: NORMAL
SP GRAVITY MISCELLANEOUS: 1.01 (ref 1–1.03)
URINE CULTURE, ROUTINE: NORMAL
UROBILINOGEN, URINE: <1.1 EU/DL
WBC: 31 /HPF (ref 0–5)

## 2020-07-31 ENCOUNTER — OFFICE VISIT (OUTPATIENT)
Dept: FAMILY MEDICINE CLINIC | Age: 75
End: 2020-07-31
Payer: MEDICARE

## 2020-07-31 VITALS
DIASTOLIC BLOOD PRESSURE: 72 MMHG | WEIGHT: 138.2 LBS | SYSTOLIC BLOOD PRESSURE: 138 MMHG | TEMPERATURE: 98.1 F | HEART RATE: 74 BPM | RESPIRATION RATE: 16 BRPM | BODY MASS INDEX: 29.91 KG/M2

## 2020-07-31 PROCEDURE — 99213 OFFICE O/P EST LOW 20 MIN: CPT | Performed by: NURSE PRACTITIONER

## 2020-07-31 PROCEDURE — 3017F COLORECTAL CA SCREEN DOC REV: CPT | Performed by: NURSE PRACTITIONER

## 2020-07-31 PROCEDURE — 4040F PNEUMOC VAC/ADMIN/RCVD: CPT | Performed by: NURSE PRACTITIONER

## 2020-07-31 PROCEDURE — G8400 PT W/DXA NO RESULTS DOC: HCPCS | Performed by: NURSE PRACTITIONER

## 2020-07-31 PROCEDURE — 1036F TOBACCO NON-USER: CPT | Performed by: NURSE PRACTITIONER

## 2020-07-31 PROCEDURE — G8427 DOCREV CUR MEDS BY ELIG CLIN: HCPCS | Performed by: NURSE PRACTITIONER

## 2020-07-31 PROCEDURE — G8417 CALC BMI ABV UP PARAM F/U: HCPCS | Performed by: NURSE PRACTITIONER

## 2020-07-31 PROCEDURE — 1123F ACP DISCUSS/DSCN MKR DOCD: CPT | Performed by: NURSE PRACTITIONER

## 2020-07-31 PROCEDURE — 1090F PRES/ABSN URINE INCON ASSESS: CPT | Performed by: NURSE PRACTITIONER

## 2020-07-31 ASSESSMENT — ENCOUNTER SYMPTOMS
NAUSEA: 0
ABDOMINAL PAIN: 0
SHORTNESS OF BREATH: 0
WHEEZING: 0

## 2020-07-31 NOTE — PROGRESS NOTES
Subjective:      Patient ID: Phong Alejandre is a 70 y.o. female. HPI  Visit Information    Have you changed or started any medications since your last visit including any over-the-counter medicines, vitamins, or herbal medicines? no   Are you having any side effects from any of your medications? -  no  Have you stopped taking any of your medications? Is so, why? -  no    Have you seen any other physician or provider since your last visit? No  Have you had any other diagnostic tests since your last visit? Yes - Records Obtained  Have you been seen in the emergency room and/or had an admission to a hospital since we last saw you? No  Have you had your routine dental cleaning in the past 6 months? no    Have you activated your [employer] account? If not, what are your barriers?  Yes     Patient Care Team:  Sindhu Norton MD as PCP - General (Family Medicine)  Sindhu Norton MD [facility]  Jose Baird (Neurosurgery)  Deb Estrella MD (Dermatology)  Sindhu Norton MD as Referring Physician (Family Medicine)    Medical History Review  Past Medical, Family, and Social History reviewed and does contribute to the patient presenting condition    Health Maintenance   Topic Date Due    Hepatitis C screen  1945    DTaP/Tdap/Td vaccine (1 - Tdap) 06/01/1966    Shingles Vaccine (1 of 2) 06/01/1997    Breast cancer screen  11/21/2016    Annual Wellness Visit (AWV)  05/29/2019    Pneumococcal 65+ years Vaccine (1 of 1 - PPSV23) 04/19/2024 (Originally 6/1/2012)    Flu vaccine (1) 09/01/2020    Colon Cancer Screen FIT/FOBT  02/10/2021    A1C test (Diabetic or Prediabetic)  07/24/2021    Lipid screen  07/24/2021    Potassium monitoring  07/24/2021    Creatinine monitoring  07/24/2021    DEXA (modify frequency per FRAX score)  Addressed    Hepatitis A vaccine  Aged Out    Hepatitis B vaccine  Aged Out    Hib vaccine  Aged Out    Meningococcal (ACWY) vaccine  Aged cancer            Plan:      BP Readings from Last 3 Encounters:   07/31/20 (!) 152/80   06/09/20 134/80   03/06/20 (!) 125/53     BP (!) 152/80 (Site: Left Upper Arm, Position: Sitting, Cuff Size: Medium Adult)   Pulse 74   Temp 98.1 °F (36.7 °C) (Infrared)   Resp 16   Wt 138 lb 3.2 oz (62.7 kg)   BMI 29.91 kg/m²   Lab Results   Component Value Date    WBC 31 (H) 07/24/2020    HGB 14.2 03/06/2020    HCT 43.0 03/06/2020     03/06/2020    CHOL 137 07/24/2020    TRIG 92 07/24/2020    HDL 44 07/24/2020    ALT 20 07/24/2020    AST 17 07/24/2020    NA 1,369 07/24/2020    K 3.6 07/24/2020     07/24/2020    CREATININE 0.72 07/24/2020    BUN 10 07/24/2020    CO2 30 07/24/2020    LABA1C 5.9 07/24/2020     Lab Results   Component Value Date    CALCIUM 9.2 07/24/2020     Lab Results   Component Value Date    LDLCALC 75 07/24/2020         1. Impaired fasting glucose  - cont diet measures     2. Essential hypertension  - 138/72 when repeated   - no therapy change     3. Mixed hyperlipidemia  - stable. cont statin therapy     4. Bilateral hearing loss, unspecified hearing loss type  - pt is going to have hearing aids via ent     5. Encounter for screening mammogram for breast cancer  - TRUDY DIGITAL SCREEN W OR WO CAD BILATERAL; Future        Requested Prescriptions      No prescriptions requested or ordered in this encounter       There are no discontinued medications. Discussed use, benefit, and side effects of prescribed medications. Barriers to medication compliance addressed. All patient questions answered. Pt voiced understanding. No follow-ups on file.             Zeny Ernandez, APRN - CNP

## 2020-10-19 RX ORDER — LISINOPRIL AND HYDROCHLOROTHIAZIDE 12.5; 1 MG/1; MG/1
TABLET ORAL
Qty: 90 TABLET | Refills: 0 | Status: SHIPPED | OUTPATIENT
Start: 2020-10-19 | End: 2021-01-14

## 2020-10-19 RX ORDER — ATORVASTATIN CALCIUM 10 MG/1
TABLET, FILM COATED ORAL
Qty: 90 TABLET | Refills: 0 | Status: SHIPPED | OUTPATIENT
Start: 2020-10-19 | End: 2021-01-14

## 2021-01-14 RX ORDER — LISINOPRIL AND HYDROCHLOROTHIAZIDE 12.5; 1 MG/1; MG/1
TABLET ORAL
Qty: 90 TABLET | Refills: 0 | Status: SHIPPED | OUTPATIENT
Start: 2021-01-14 | End: 2021-04-19

## 2021-01-14 RX ORDER — ATORVASTATIN CALCIUM 10 MG/1
TABLET, FILM COATED ORAL
Qty: 90 TABLET | Refills: 0 | Status: SHIPPED | OUTPATIENT
Start: 2021-01-14 | End: 2021-04-19

## 2021-01-28 ENCOUNTER — TELEPHONE (OUTPATIENT)
Dept: FAMILY MEDICINE CLINIC | Age: 76
End: 2021-01-28

## 2021-01-28 DIAGNOSIS — I10 ESSENTIAL HYPERTENSION: ICD-10-CM

## 2021-01-28 DIAGNOSIS — R73.01 IMPAIRED FASTING GLUCOSE: Primary | ICD-10-CM

## 2021-01-28 DIAGNOSIS — E78.2 MIXED HYPERLIPIDEMIA: ICD-10-CM

## 2021-01-29 LAB
ALBUMIN SERPL-MCNC: 4.2 G/DL
ALP BLD-CCNC: 79 U/L
ALT SERPL-CCNC: 15 U/L
ANION GAP SERPL CALCULATED.3IONS-SCNC: 9 MMOL/L
AST SERPL-CCNC: 16 U/L
AVERAGE GLUCOSE: 123
BASOPHILS ABSOLUTE: ABNORMAL
BASOPHILS RELATIVE PERCENT: ABNORMAL
BILIRUB SERPL-MCNC: 0.6 MG/DL (ref 0.1–1.4)
BUN BLDV-MCNC: 9 MG/DL
CALCIUM SERPL-MCNC: 9.6 MG/DL
CHLORIDE BLD-SCNC: 100 MMOL/L
CHOLESTEROL, TOTAL: 164 MG/DL
CHOLESTEROL/HDL RATIO: 3.5
CO2: 30 MMOL/L
CREAT SERPL-MCNC: 0.71 MG/DL
EOSINOPHILS ABSOLUTE: 0.2 /ΜL
EOSINOPHILS RELATIVE PERCENT: 2.1 %
GFR CALCULATED: >60
GLUCOSE BLD-MCNC: 99 MG/DL
HBA1C MFR BLD: 5.9 %
HCT VFR BLD CALC: 45.6 % (ref 36–46)
HDLC SERPL-MCNC: 47 MG/DL (ref 35–70)
HEMOGLOBIN: 15.1 G/DL (ref 12–16)
LDL CHOLESTEROL CALCULATED: 90 MG/DL (ref 0–160)
LYMPHOCYTES ABSOLUTE: 2.1 /ΜL
LYMPHOCYTES RELATIVE PERCENT: 19.6 %
MCH RBC QN AUTO: 29.6 PG
MCHC RBC AUTO-ENTMCNC: 33.1 G/DL
MCV RBC AUTO: 89 FL
MONOCYTES ABSOLUTE: 0.7 /ΜL
MONOCYTES RELATIVE PERCENT: 6.2 %
NEUTROPHILS ABSOLUTE: 7.7 /ΜL
NEUTROPHILS RELATIVE PERCENT: 72.1 %
NONHDLC SERPL-MCNC: NORMAL MG/DL
PLATELET # BLD: 279 K/ΜL
PMV BLD AUTO: 10.3 FL
POTASSIUM SERPL-SCNC: 3.8 MMOL/L
RBC # BLD: 5.1 10^6/ΜL
SODIUM BLD-SCNC: 139 MMOL/L
TOTAL PROTEIN: 7.9
TRIGL SERPL-MCNC: 136 MG/DL
VLDLC SERPL CALC-MCNC: 27 MG/DL
WBC # BLD: 10.6 10^3/ML

## 2021-02-01 DIAGNOSIS — R73.01 IMPAIRED FASTING GLUCOSE: ICD-10-CM

## 2021-02-01 DIAGNOSIS — I10 ESSENTIAL HYPERTENSION: ICD-10-CM

## 2021-02-01 DIAGNOSIS — E78.2 MIXED HYPERLIPIDEMIA: ICD-10-CM

## 2021-02-04 RX ORDER — CALCIUM CARBONATE/VITAMIN D3 500-10/5ML
30 LIQUID (ML) ORAL WEEKLY
COMMUNITY

## 2021-02-04 RX ORDER — MULTIVIT WITH MINERALS/LUTEIN
250 TABLET ORAL DAILY
COMMUNITY

## 2021-02-04 RX ORDER — CHLORHEXIDINE GLUCONATE 0.12 MG/ML
RINSE ORAL
COMMUNITY
Start: 2020-10-30

## 2021-02-04 ASSESSMENT — PATIENT HEALTH QUESTIONNAIRE - PHQ9
SUM OF ALL RESPONSES TO PHQ9 QUESTIONS 1 & 2: 0
2. FEELING DOWN, DEPRESSED OR HOPELESS: 0
1. LITTLE INTEREST OR PLEASURE IN DOING THINGS: 0

## 2021-02-04 NOTE — PROGRESS NOTES
Lacy Tejeda is a 70 y.o. female evaluated via telephone on 2/5/2021. Consent:  She and/or health care decision maker is aware that that she may receive a bill for this telephone service, depending on her insurance coverage, and has provided verbal consent to proceed: Yes      Documentation:  I communicated with the patient and/or health care decision maker about IFG HTN HlD     Details of this discussion including any medical advice provided:     70year old female presents with follow up for IFG HTN HLD. Has been on diet and recent a1c was 5.9. states she watches diet and lost 3 ibs since last visit. Currently is on monotherapy and states her bp is 110 /72, p92. Is compliant with statin therapy and tolerates it well. Recent blood tests were unremarkable. denies fever chills nasal congestion cough sob cp or nv abd pain. No sick contacts     1. Impaired fasting glucose  - stable. Cont diet measures     2. Essential hypertension  - cont current bp medication     3. Mixed hyperlipidemia  - cont statin therapy     Requested Prescriptions      No prescriptions requested or ordered in this encounter       There are no discontinued medications. Discussed use, benefit, and side effects of prescribed medications. Barriers to medication compliance addressed. All patient questions answered. Pt voiced understanding. No follow-ups on file. I affirm this is a Patient Initiated Episode with a Patient who has not had a related appointment within my department in the past 7 days or scheduled within the next 24 hours.     Patient identification was verified at the start of the visit: Yes    Total Time: minutes: 21-30 minutes    Note: not billable if this call serves to triage the patient into an appointment for the relevant concern

## 2021-02-05 ENCOUNTER — VIRTUAL VISIT (OUTPATIENT)
Dept: FAMILY MEDICINE CLINIC | Age: 76
End: 2021-02-05
Payer: MEDICARE

## 2021-02-05 DIAGNOSIS — I10 ESSENTIAL HYPERTENSION: ICD-10-CM

## 2021-02-05 DIAGNOSIS — R73.01 IMPAIRED FASTING GLUCOSE: Primary | ICD-10-CM

## 2021-02-05 DIAGNOSIS — E78.2 MIXED HYPERLIPIDEMIA: ICD-10-CM

## 2021-02-05 PROCEDURE — 99213 OFFICE O/P EST LOW 20 MIN: CPT | Performed by: NURSE PRACTITIONER

## 2021-02-05 PROCEDURE — 1123F ACP DISCUSS/DSCN MKR DOCD: CPT | Performed by: NURSE PRACTITIONER

## 2021-02-05 PROCEDURE — G8427 DOCREV CUR MEDS BY ELIG CLIN: HCPCS | Performed by: NURSE PRACTITIONER

## 2021-02-05 PROCEDURE — 1090F PRES/ABSN URINE INCON ASSESS: CPT | Performed by: NURSE PRACTITIONER

## 2021-02-05 PROCEDURE — G8400 PT W/DXA NO RESULTS DOC: HCPCS | Performed by: NURSE PRACTITIONER

## 2021-02-05 PROCEDURE — 4040F PNEUMOC VAC/ADMIN/RCVD: CPT | Performed by: NURSE PRACTITIONER

## 2021-02-05 PROCEDURE — 3017F COLORECTAL CA SCREEN DOC REV: CPT | Performed by: NURSE PRACTITIONER

## 2021-04-19 RX ORDER — ATORVASTATIN CALCIUM 10 MG/1
TABLET, FILM COATED ORAL
Qty: 90 TABLET | Refills: 0 | Status: SHIPPED | OUTPATIENT
Start: 2021-04-19 | End: 2021-10-25

## 2021-04-19 RX ORDER — LISINOPRIL AND HYDROCHLOROTHIAZIDE 12.5; 1 MG/1; MG/1
TABLET ORAL
Qty: 90 TABLET | Refills: 0 | Status: SHIPPED | OUTPATIENT
Start: 2021-04-19 | End: 2021-07-22 | Stop reason: SDUPTHER

## 2021-07-22 RX ORDER — LISINOPRIL AND HYDROCHLOROTHIAZIDE 12.5; 1 MG/1; MG/1
TABLET ORAL
Qty: 90 TABLET | Refills: 0 | Status: SHIPPED | OUTPATIENT
Start: 2021-07-22 | End: 2021-10-19

## 2021-08-25 ENCOUNTER — TELEPHONE (OUTPATIENT)
Dept: FAMILY MEDICINE CLINIC | Age: 76
End: 2021-08-25

## 2021-08-25 DIAGNOSIS — R73.01 IMPAIRED FASTING GLUCOSE: Primary | ICD-10-CM

## 2021-08-25 DIAGNOSIS — E78.2 MIXED HYPERLIPIDEMIA: ICD-10-CM

## 2021-08-25 DIAGNOSIS — I10 ESSENTIAL HYPERTENSION: ICD-10-CM

## 2021-08-25 NOTE — TELEPHONE ENCOUNTER
Patient has an upcoming appointment and needs labs drawn before appointment. Please call the patient to let her know when lab orders have been faxed to [facility] on [address] at [phone]. Please advise.

## 2021-08-30 LAB
ALBUMIN SERPL-MCNC: 4.3 G/DL (ref 3.2–5.3)
ALK PHOSPHATASE: 75 U/L (ref 39–130)
ALT SERPL-CCNC: 16 U/L (ref 0–31)
ANION GAP SERPL CALCULATED.3IONS-SCNC: 7 MMOL/L (ref 5–15)
AST SERPL-CCNC: 15 U/L (ref 0–41)
AVERAGE GLUCOSE: 126 MG/DL
BILIRUB SERPL-MCNC: 0.7 MG/DL (ref 0.3–1.2)
BUN BLDV-MCNC: 10 MG/DL (ref 5–27)
CALCIUM SERPL-MCNC: 9.4 MG/DL (ref 8.5–10.5)
CHLORIDE BLD-SCNC: 101 MMOL/L (ref 98–109)
CHOLESTEROL/HDL RATIO: 3.7 (ref 1–5)
CHOLESTEROL: 166 MG/DL (ref 150–200)
CO2: 31 MMOL/L (ref 22–32)
CREAT SERPL-MCNC: 0.7 MG/DL (ref 0.4–1)
EGFR AFRICAN AMERICAN: >60 ML/MIN/1.73SQ.M
EGFR IF NONAFRICAN AMERICAN: >60 ML/MIN/1.73SQ.M
GLUCOSE: 93 MG/DL (ref 65–99)
HBA1C MFR BLD: 6 % (ref 4.4–6.4)
HCT VFR BLD CALC: 43.7 % (ref 35–47)
HDLC SERPL-MCNC: 45 MG/DL
HEMOGLOBIN: 14.6 G/DL (ref 11.7–15.5)
LDL CHOLESTEROL CALCULATED: 99 MG/DL
LDL/HDL RATIO: 2.2
MAGNESIUM: 2 MG/DL (ref 1.8–2.6)
POTASSIUM SERPL-SCNC: 3.9 MMOL/L (ref 3.5–5)
SODIUM BLD-SCNC: 139 MMOL/L (ref 134–146)
TOTAL PROTEIN: 7.5 G/DL (ref 6–8)
TRIGL SERPL-MCNC: 109 MG/DL (ref 27–150)
VLDLC SERPL CALC-MCNC: 22 MG/DL (ref 0–30)

## 2021-09-03 ENCOUNTER — OFFICE VISIT (OUTPATIENT)
Dept: FAMILY MEDICINE CLINIC | Age: 76
End: 2021-09-03
Payer: MEDICARE

## 2021-09-03 VITALS
WEIGHT: 135.8 LBS | BODY MASS INDEX: 29.3 KG/M2 | HEART RATE: 94 BPM | TEMPERATURE: 97.2 F | DIASTOLIC BLOOD PRESSURE: 82 MMHG | SYSTOLIC BLOOD PRESSURE: 138 MMHG | HEIGHT: 57 IN

## 2021-09-03 DIAGNOSIS — Z12.11 SCREEN FOR COLON CANCER: ICD-10-CM

## 2021-09-03 DIAGNOSIS — I10 ESSENTIAL HYPERTENSION: ICD-10-CM

## 2021-09-03 DIAGNOSIS — R73.01 IMPAIRED FASTING GLUCOSE: Primary | ICD-10-CM

## 2021-09-03 DIAGNOSIS — E78.2 MIXED HYPERLIPIDEMIA: ICD-10-CM

## 2021-09-03 PROCEDURE — 3017F COLORECTAL CA SCREEN DOC REV: CPT | Performed by: NURSE PRACTITIONER

## 2021-09-03 PROCEDURE — G8417 CALC BMI ABV UP PARAM F/U: HCPCS | Performed by: NURSE PRACTITIONER

## 2021-09-03 PROCEDURE — 1036F TOBACCO NON-USER: CPT | Performed by: NURSE PRACTITIONER

## 2021-09-03 PROCEDURE — G8400 PT W/DXA NO RESULTS DOC: HCPCS | Performed by: NURSE PRACTITIONER

## 2021-09-03 PROCEDURE — G8427 DOCREV CUR MEDS BY ELIG CLIN: HCPCS | Performed by: NURSE PRACTITIONER

## 2021-09-03 PROCEDURE — 99214 OFFICE O/P EST MOD 30 MIN: CPT | Performed by: NURSE PRACTITIONER

## 2021-09-03 PROCEDURE — 1123F ACP DISCUSS/DSCN MKR DOCD: CPT | Performed by: NURSE PRACTITIONER

## 2021-09-03 PROCEDURE — 4040F PNEUMOC VAC/ADMIN/RCVD: CPT | Performed by: NURSE PRACTITIONER

## 2021-09-03 PROCEDURE — 1090F PRES/ABSN URINE INCON ASSESS: CPT | Performed by: NURSE PRACTITIONER

## 2021-09-03 SDOH — ECONOMIC STABILITY: TRANSPORTATION INSECURITY
IN THE PAST 12 MONTHS, HAS THE LACK OF TRANSPORTATION KEPT YOU FROM MEDICAL APPOINTMENTS OR FROM GETTING MEDICATIONS?: NO

## 2021-09-03 SDOH — ECONOMIC STABILITY: FOOD INSECURITY: WITHIN THE PAST 12 MONTHS, YOU WORRIED THAT YOUR FOOD WOULD RUN OUT BEFORE YOU GOT MONEY TO BUY MORE.: NEVER TRUE

## 2021-09-03 SDOH — ECONOMIC STABILITY: FOOD INSECURITY: WITHIN THE PAST 12 MONTHS, THE FOOD YOU BOUGHT JUST DIDN'T LAST AND YOU DIDN'T HAVE MONEY TO GET MORE.: NEVER TRUE

## 2021-09-03 SDOH — ECONOMIC STABILITY: TRANSPORTATION INSECURITY
IN THE PAST 12 MONTHS, HAS LACK OF TRANSPORTATION KEPT YOU FROM MEETINGS, WORK, OR FROM GETTING THINGS NEEDED FOR DAILY LIVING?: NO

## 2021-09-03 SDOH — ECONOMIC STABILITY: INCOME INSECURITY: HOW HARD IS IT FOR YOU TO PAY FOR THE VERY BASICS LIKE FOOD, HOUSING, MEDICAL CARE, AND HEATING?: NOT HARD AT ALL

## 2021-09-03 ASSESSMENT — ENCOUNTER SYMPTOMS
VOMITING: 0
WHEEZING: 0
ABDOMINAL PAIN: 0
NAUSEA: 0
SHORTNESS OF BREATH: 0

## 2021-09-03 NOTE — PROGRESS NOTES
Assessment:      1. Impaired fasting glucose    2. Essential hypertension    3. Mixed hyperlipidemia    4. Screen for colon cancer            Plan:      BP Readings from Last 3 Encounters:   09/03/21 138/82   07/31/20 138/72   06/09/20 134/80     /82   Pulse 94   Temp 97.2 °F (36.2 °C) (Infrared)   Ht 4' 9\" (1.448 m)   Wt 135 lb 12.8 oz (61.6 kg)   BMI 29.39 kg/m²   Lab Results   Component Value Date    WBC 10.6 01/29/2021    HGB 14.6 08/30/2021    HCT 43.7 08/30/2021     01/29/2021    CHOL 166 08/30/2021    TRIG 109 08/30/2021    HDL 45 08/30/2021    ALT 16 08/30/2021    AST 15 08/30/2021     08/30/2021    K 3.9 08/30/2021     08/30/2021    CREATININE 0.70 08/30/2021    BUN 10 08/30/2021    CO2 31 08/30/2021    LABA1C 6.0 08/30/2021     Lab Results   Component Value Date    CALCIUM 9.4 08/30/2021     Lab Results   Component Value Date    LDLCALC 99 08/30/2021     1. Impaired fasting glucose  - cont diet measures   - Hemoglobin A1C; Future    2. Essential hypertension  Stable. No therapy change   - Basic Metabolic Panel; Future    3. Mixed hyperlipidemia  - Lipid Panel; Future  - ALT; Future  - cont statin therapy     4. Screen for colon cancer  - POCT Fecal Immunochemical Test (FIT); Future    Requested Prescriptions      No prescriptions requested or ordered in this encounter       There are no discontinued medications. Discussed use, benefit, and side effects of prescribed medications. Barriers to medication compliance addressed. All patient questions answered. Pt voiced understanding. Return in about 27 weeks (around 3/11/2022) for IFG, HTN, HLD.

## 2021-10-19 RX ORDER — LISINOPRIL AND HYDROCHLOROTHIAZIDE 12.5; 1 MG/1; MG/1
TABLET ORAL
Qty: 90 TABLET | Refills: 1 | Status: SHIPPED | OUTPATIENT
Start: 2021-10-19 | End: 2022-04-25

## 2021-10-25 RX ORDER — ATORVASTATIN CALCIUM 10 MG/1
TABLET, FILM COATED ORAL
Qty: 90 TABLET | Refills: 1 | Status: SHIPPED | OUTPATIENT
Start: 2021-10-25 | End: 2022-05-09

## 2022-04-25 RX ORDER — LISINOPRIL AND HYDROCHLOROTHIAZIDE 12.5; 1 MG/1; MG/1
TABLET ORAL
Qty: 90 TABLET | Refills: 0 | Status: SHIPPED | OUTPATIENT
Start: 2022-04-25 | End: 2022-07-25

## 2022-05-09 RX ORDER — ATORVASTATIN CALCIUM 10 MG/1
TABLET, FILM COATED ORAL
Qty: 90 TABLET | Refills: 0 | Status: SHIPPED | OUTPATIENT
Start: 2022-05-09 | End: 2022-07-25

## 2022-05-23 ENCOUNTER — HOSPITAL ENCOUNTER (OUTPATIENT)
Age: 77
Setting detail: SPECIMEN
Discharge: HOME | End: 2022-05-23

## 2022-05-23 DIAGNOSIS — R73.01 IMPAIRED FASTING GLUCOSE: ICD-10-CM

## 2022-05-23 DIAGNOSIS — I10 ESSENTIAL HYPERTENSION: ICD-10-CM

## 2022-05-23 DIAGNOSIS — E78.2 MIXED HYPERLIPIDEMIA: ICD-10-CM

## 2022-05-23 LAB
ALT SERPL-CCNC: 14 U/L (ref 5–33)
ANION GAP SERPL CALCULATED.3IONS-SCNC: 15 MMOL/L (ref 9–17)
BUN BLDV-MCNC: 8 MG/DL (ref 8–23)
CALCIUM SERPL-MCNC: 9.6 MG/DL (ref 8.6–10.4)
CHLORIDE BLD-SCNC: 101 MMOL/L (ref 98–107)
CHOLESTEROL/HDL RATIO: 3.5
CHOLESTEROL: 169 MG/DL
CO2: 25 MMOL/L (ref 20–31)
CREAT SERPL-MCNC: 0.57 MG/DL (ref 0.5–0.9)
ESTIMATED AVERAGE GLUCOSE: 120 MG/DL
GFR AFRICAN AMERICAN: >60 ML/MIN
GFR NON-AFRICAN AMERICAN: >60 ML/MIN
GFR SERPL CREATININE-BSD FRML MDRD: NORMAL ML/MIN/{1.73_M2}
GLUCOSE BLD-MCNC: 97 MG/DL (ref 70–99)
HBA1C MFR BLD: 5.8 % (ref 4–6)
HDLC SERPL-MCNC: 48 MG/DL
LDL CHOLESTEROL: 100 MG/DL (ref 0–130)
POTASSIUM SERPL-SCNC: 4.1 MMOL/L (ref 3.7–5.3)
SODIUM BLD-SCNC: 141 MMOL/L (ref 135–144)
TRIGL SERPL-MCNC: 105 MG/DL

## 2022-05-27 ENCOUNTER — OFFICE VISIT (OUTPATIENT)
Dept: FAMILY MEDICINE CLINIC | Age: 77
End: 2022-05-27
Payer: MEDICARE

## 2022-05-27 VITALS
HEART RATE: 96 BPM | BODY MASS INDEX: 28.13 KG/M2 | WEIGHT: 130.4 LBS | TEMPERATURE: 97.3 F | HEIGHT: 57 IN | SYSTOLIC BLOOD PRESSURE: 122 MMHG | DIASTOLIC BLOOD PRESSURE: 70 MMHG | OXYGEN SATURATION: 95 %

## 2022-05-27 DIAGNOSIS — I10 ESSENTIAL HYPERTENSION: ICD-10-CM

## 2022-05-27 DIAGNOSIS — E78.2 MIXED HYPERLIPIDEMIA: ICD-10-CM

## 2022-05-27 DIAGNOSIS — Z12.31 SCREENING MAMMOGRAM FOR BREAST CANCER: ICD-10-CM

## 2022-05-27 DIAGNOSIS — Z12.11 SCREEN FOR COLON CANCER: ICD-10-CM

## 2022-05-27 DIAGNOSIS — R73.01 IMPAIRED FASTING GLUCOSE: Primary | ICD-10-CM

## 2022-05-27 LAB
CONTROL: PRESENT
HEMOCCULT STL QL: NEGATIVE

## 2022-05-27 PROCEDURE — G8427 DOCREV CUR MEDS BY ELIG CLIN: HCPCS | Performed by: NURSE PRACTITIONER

## 2022-05-27 PROCEDURE — G8417 CALC BMI ABV UP PARAM F/U: HCPCS | Performed by: NURSE PRACTITIONER

## 2022-05-27 PROCEDURE — G8400 PT W/DXA NO RESULTS DOC: HCPCS | Performed by: NURSE PRACTITIONER

## 2022-05-27 PROCEDURE — 1123F ACP DISCUSS/DSCN MKR DOCD: CPT | Performed by: NURSE PRACTITIONER

## 2022-05-27 PROCEDURE — 1036F TOBACCO NON-USER: CPT | Performed by: NURSE PRACTITIONER

## 2022-05-27 PROCEDURE — 99214 OFFICE O/P EST MOD 30 MIN: CPT | Performed by: NURSE PRACTITIONER

## 2022-05-27 PROCEDURE — 3017F COLORECTAL CA SCREEN DOC REV: CPT | Performed by: NURSE PRACTITIONER

## 2022-05-27 PROCEDURE — 82274 ASSAY TEST FOR BLOOD FECAL: CPT | Performed by: NURSE PRACTITIONER

## 2022-05-27 PROCEDURE — 1090F PRES/ABSN URINE INCON ASSESS: CPT | Performed by: NURSE PRACTITIONER

## 2022-05-27 ASSESSMENT — PATIENT HEALTH QUESTIONNAIRE - PHQ9
SUM OF ALL RESPONSES TO PHQ QUESTIONS 1-9: 0
1. LITTLE INTEREST OR PLEASURE IN DOING THINGS: 0

## 2022-05-27 NOTE — PROGRESS NOTES
Visit Information    Have you changed or started any medications since your last visit including any over-the-counter medicines, vitamins, or herbal medicines? no   Are you having any side effects from any of your medications? -  no  Have you stopped taking any of your medications? Is so, why? -  no    Have you seen any other physician or provider since your last visit? No  Have you had any other diagnostic tests since your last visit? Yes - Records Obtained  Have you been seen in the emergency room and/or had an admission to a hospital since we last saw you? No  Have you had your routine dental cleaning in the past 6 months? yes     Have you activated your [employer] account? If not, what are your barriers?  Yes     Patient Care Team:  Trinh Espinoza MD as PCP - General (Family Medicine)  Trinh Espinoza MD as PCP - [facility] Provider  Gisele Greenfield (Neurosurgery)  Anna Marie Rdz MD (Dermatology)  Trinh Espinoza MD as Referring Physician (Family Medicine)    Medical History Review  Past Medical, Family, and Social History reviewed and does contribute to the patient presenting condition    Health Maintenance   Topic Date Due    Annual Wellness Visit (AWV)  Never done    Hepatitis C screen  Never done    DTaP/Tdap/Td vaccine (1 - Tdap) Never done    Shingles vaccine (1 of 2) Never done    Breast cancer screen  11/21/2016    Colorectal Cancer Screen  02/10/2021    Depression Screen  02/04/2022    Pneumococcal 65+ years Vaccine (1 - PCV) 04/19/2024 (Originally 6/1/2012)    Flu vaccine (Season Ended) 09/01/2022    A1C test (Diabetic or Prediabetic)  05/23/2023    Lipids  05/23/2023    COVID-19 Vaccine  Completed    DEXA (modify frequency per FRAX score)  Addressed    Hepatitis A vaccine  Aged Out    Hepatitis B vaccine  Aged Out    Hib vaccine  Aged Out    Meningococcal (ACWY) vaccine  Aged Out

## 2022-05-27 NOTE — PROGRESS NOTES
Subjective:      Patient ID: Leighton Noe is a 78 y.o. female. HPI     78year old female presents with management of IFG HTN HLD. Noted to have elevated fasting glucose and has been on diet measures. Recent a1c was 5.8 ( was  6 last sept). bp is stable and currently is on monotherapy. Is compliant with  Currently is on monotherapy for bp and it is stable.  Is compliant with statin therapy and routine medications and  tolerates them well.  denies fever chills cough sob chest pain or nv abd pain.        Review of Systems   Constitutional: Negative for chills and fever. Eyes: Negative for visual disturbance. Respiratory: Negative for shortness of breath and wheezing. Cardiovascular: Negative for chest pain and leg swelling. Gastrointestinal: Negative for abdominal pain, nausea and vomiting. Neurological: Negative for dizziness, weakness and numbness. Psychiatric/Behavioral: Negative for agitation and behavioral problems. Objective:   Physical Exam  Vitals and nursing note reviewed. Constitutional:       General: She is not in acute distress. Appearance: Normal appearance. HENT:      Right Ear: External ear normal.      Left Ear: External ear normal.   Eyes:      General: No scleral icterus. Conjunctiva/sclera: Conjunctivae normal.   Cardiovascular:      Rate and Rhythm: Normal rate and regular rhythm. Pulses: Normal pulses. Heart sounds: Normal heart sounds. Pulmonary:      Effort: Pulmonary effort is normal. No respiratory distress. Breath sounds: Normal breath sounds. Abdominal:      Palpations: Abdomen is soft. Tenderness: There is no abdominal tenderness. Musculoskeletal:         General: No tenderness. Normal range of motion. Cervical back: Normal range of motion and neck supple. Skin:     General: Skin is warm and dry. Neurological:      Mental Status: She is alert and oriented to person, place, and time.       Cranial Nerves: No cranial nerve deficit. Psychiatric:         Mood and Affect: Mood normal.         Behavior: Behavior normal.         Assessment:      1. Impaired fasting glucose    2. Essential hypertension    3. Mixed hyperlipidemia    4. Screening mammogram for breast cancer            Plan:      BP Readings from Last 3 Encounters:   05/27/22 122/70   09/03/21 138/82   07/31/20 138/72     /70   Pulse 96   Temp 97.3 °F (36.3 °C) (Infrared)   Ht 4' 9\" (1.448 m)   Wt 130 lb 6.4 oz (59.1 kg)   SpO2 95%   BMI 28.22 kg/m²   Lab Results   Component Value Date    WBC 10.6 01/29/2021    HGB 14.6 08/30/2021    HCT 43.7 08/30/2021     01/29/2021    CHOL 169 05/23/2022    TRIG 105 05/23/2022    HDL 48 05/23/2022    ALT 14 05/23/2022    AST 15 08/30/2021     05/23/2022    K 4.1 05/23/2022     05/23/2022    CREATININE 0.57 05/23/2022    BUN 8 05/23/2022    CO2 25 05/23/2022    LABA1C 5.8 05/23/2022     Lab Results   Component Value Date    CALCIUM 9.6 05/23/2022     Lab Results   Component Value Date    LDLCALC 99 08/30/2021    LDLCHOLESTEROL 100 05/23/2022         1. Impaired fasting glucose  - cont diet measures. - discussed diet measures and weight reduction     2. Essential hypertension  - cont current bp therapy     3. Mixed hyperlipidemia  -cont statin therapy     4. Screening mammogram for breast cancer  - pt declined mammogram due to covid pandemic         Requested Prescriptions      No prescriptions requested or ordered in this encounter       There are no discontinued medications. Discussed use, benefit, and side effects of prescribed medications. Barriers to medication compliance addressed. All patient questions answered. Pt voiced understanding. Return in about 18 weeks (around 9/30/2022) for IFG, HTN, HLD.             Tone Samaniego, APRN - CNP

## 2022-05-30 ASSESSMENT — ENCOUNTER SYMPTOMS
WHEEZING: 0
VOMITING: 0
SHORTNESS OF BREATH: 0
ABDOMINAL PAIN: 0
NAUSEA: 0

## 2022-05-31 ENCOUNTER — TELEPHONE (OUTPATIENT)
Dept: FAMILY MEDICINE CLINIC | Age: 77
End: 2022-05-31

## 2022-05-31 DIAGNOSIS — I10 ESSENTIAL HYPERTENSION: ICD-10-CM

## 2022-05-31 DIAGNOSIS — E78.2 MIXED HYPERLIPIDEMIA: ICD-10-CM

## 2022-05-31 DIAGNOSIS — R73.01 IMPAIRED FASTING GLUCOSE: Primary | ICD-10-CM

## 2022-07-25 RX ORDER — ATORVASTATIN CALCIUM 10 MG/1
TABLET, FILM COATED ORAL
Qty: 90 TABLET | Refills: 0 | Status: SHIPPED | OUTPATIENT
Start: 2022-07-25 | End: 2022-10-27 | Stop reason: SDUPTHER

## 2022-07-25 RX ORDER — LISINOPRIL AND HYDROCHLOROTHIAZIDE 12.5; 1 MG/1; MG/1
TABLET ORAL
Qty: 90 TABLET | Refills: 0 | Status: SHIPPED | OUTPATIENT
Start: 2022-07-25 | End: 2022-10-27 | Stop reason: SDUPTHER

## 2022-10-27 RX ORDER — LISINOPRIL AND HYDROCHLOROTHIAZIDE 12.5; 1 MG/1; MG/1
TABLET ORAL
Qty: 90 TABLET | Refills: 1 | Status: SHIPPED | OUTPATIENT
Start: 2022-10-27

## 2022-10-27 RX ORDER — ATORVASTATIN CALCIUM 10 MG/1
TABLET, FILM COATED ORAL
Qty: 90 TABLET | Refills: 1 | Status: SHIPPED | OUTPATIENT
Start: 2022-10-27

## 2022-11-28 ENCOUNTER — HOSPITAL ENCOUNTER (OUTPATIENT)
Age: 77
Setting detail: SPECIMEN
Discharge: HOME | End: 2022-11-28

## 2022-11-28 DIAGNOSIS — R73.01 IMPAIRED FASTING GLUCOSE: ICD-10-CM

## 2022-11-28 DIAGNOSIS — I10 ESSENTIAL HYPERTENSION: ICD-10-CM

## 2022-11-28 DIAGNOSIS — E78.2 MIXED HYPERLIPIDEMIA: ICD-10-CM

## 2022-11-28 LAB
ALBUMIN SERPL-MCNC: 4.2 G/DL (ref 3.5–5.2)
ALBUMIN/GLOBULIN RATIO: 1.2 (ref 1–2.5)
ALP BLD-CCNC: 78 U/L (ref 35–104)
ALT SERPL-CCNC: 11 U/L (ref 5–33)
ANION GAP SERPL CALCULATED.3IONS-SCNC: 15 MMOL/L (ref 9–17)
AST SERPL-CCNC: 16 U/L
BILIRUB SERPL-MCNC: 0.4 MG/DL (ref 0.3–1.2)
BUN BLDV-MCNC: 8 MG/DL (ref 8–23)
CALCIUM SERPL-MCNC: 9.2 MG/DL (ref 8.6–10.4)
CHLORIDE BLD-SCNC: 99 MMOL/L (ref 98–107)
CHOLESTEROL/HDL RATIO: 3.6
CHOLESTEROL: 175 MG/DL
CO2: 25 MMOL/L (ref 20–31)
CREAT SERPL-MCNC: 0.53 MG/DL (ref 0.5–0.9)
ESTIMATED AVERAGE GLUCOSE: 123 MG/DL
GFR SERPL CREATININE-BSD FRML MDRD: >60 ML/MIN/1.73M2
GLUCOSE BLD-MCNC: 95 MG/DL (ref 70–99)
HBA1C MFR BLD: 5.9 % (ref 4–6)
HCT VFR BLD CALC: 48.3 % (ref 36.3–47.1)
HDLC SERPL-MCNC: 49 MG/DL
HEMOGLOBIN: 14.7 G/DL (ref 11.9–15.1)
LDL CHOLESTEROL: 105 MG/DL (ref 0–130)
POTASSIUM SERPL-SCNC: 3.8 MMOL/L (ref 3.7–5.3)
SODIUM BLD-SCNC: 139 MMOL/L (ref 135–144)
TOTAL PROTEIN: 7.7 G/DL (ref 6.4–8.3)
TRIGL SERPL-MCNC: 105 MG/DL

## 2022-12-01 ENCOUNTER — OFFICE VISIT (OUTPATIENT)
Dept: FAMILY MEDICINE CLINIC | Age: 77
End: 2022-12-01

## 2022-12-01 VITALS
RESPIRATION RATE: 12 BRPM | OXYGEN SATURATION: 97 % | SYSTOLIC BLOOD PRESSURE: 126 MMHG | DIASTOLIC BLOOD PRESSURE: 76 MMHG | BODY MASS INDEX: 27.96 KG/M2 | WEIGHT: 129.2 LBS | HEART RATE: 91 BPM

## 2022-12-01 DIAGNOSIS — R73.01 IMPAIRED FASTING GLUCOSE: ICD-10-CM

## 2022-12-01 DIAGNOSIS — I10 ESSENTIAL HYPERTENSION: Primary | ICD-10-CM

## 2022-12-01 DIAGNOSIS — E78.2 MIXED HYPERLIPIDEMIA: ICD-10-CM

## 2022-12-01 SDOH — ECONOMIC STABILITY: FOOD INSECURITY: WITHIN THE PAST 12 MONTHS, THE FOOD YOU BOUGHT JUST DIDN'T LAST AND YOU DIDN'T HAVE MONEY TO GET MORE.: NEVER TRUE

## 2022-12-01 SDOH — ECONOMIC STABILITY: FOOD INSECURITY: WITHIN THE PAST 12 MONTHS, YOU WORRIED THAT YOUR FOOD WOULD RUN OUT BEFORE YOU GOT MONEY TO BUY MORE.: NEVER TRUE

## 2022-12-01 ASSESSMENT — ENCOUNTER SYMPTOMS
ABDOMINAL PAIN: 0
SHORTNESS OF BREATH: 0
CHEST TIGHTNESS: 0
BLOOD IN STOOL: 0

## 2022-12-01 ASSESSMENT — PATIENT HEALTH QUESTIONNAIRE - PHQ9
2. FEELING DOWN, DEPRESSED OR HOPELESS: 0
SUM OF ALL RESPONSES TO PHQ9 QUESTIONS 1 & 2: 0
SUM OF ALL RESPONSES TO PHQ QUESTIONS 1-9: 0
1. LITTLE INTEREST OR PLEASURE IN DOING THINGS: 0

## 2022-12-01 ASSESSMENT — SOCIAL DETERMINANTS OF HEALTH (SDOH): HOW HARD IS IT FOR YOU TO PAY FOR THE VERY BASICS LIKE FOOD, HOUSING, MEDICAL CARE, AND HEATING?: NOT HARD AT ALL

## 2022-12-01 NOTE — PROGRESS NOTES
Subjective:      Patient ID: Camacho Nuñez is a 78 y.o. female. HPIVisit Information    Have you changed or started any medications since your last visit including any over-the-counter medicines, vitamins, or herbal medicines? no   Are you having any side effects from any of your medications? -  no  Have you stopped taking any of your medications? Is so, why? -  no    Have you seen any other physician or provider since your last visit? No  Have you had any other diagnostic tests since your last visit? Yes - Records Obtained  Have you been seen in the emergency room and/or had an admission to a hospital since we last saw you? No  Have you had your routine dental cleaning in the past 6 months? yes - appt  in feb    Have you activated your [employer] account? If not, what are your barriers?  Yes     Patient Care Team:  Jacqui Bowers MD as PCP - General (Family Medicine)  Jacqui Bowers MD as PCP - [facility]  Joe Golden (Neurosurgery)  Luis Angel Rincon MD (Dermatology)  Jacqui Bowers MD as Referring Physician (Family Medicine)    Medical History Review  Past Medical, Family, and Social History reviewed and does contribute to the patient presenting condition    Health Maintenance   Topic Date Due    Hepatitis C screen  Never done    DTaP/Tdap/Td vaccine (1 - Tdap) Never done    Shingles vaccine (1 of 2) Never done    Annual Wellness Visit (AWV)  Never done    COVID-19 Vaccine (4 - Booster for Melton Peter series) 01/28/2022    Flu vaccine (1) Never done    Pneumococcal 65+ years Vaccine (1 - PCV) 04/19/2024 (Originally 6/1/2012)    Depression Screen  05/27/2023    Colorectal Cancer Screen  05/27/2023    A1C test (Diabetic or Prediabetic)  11/28/2023    Lipids  11/28/2023    DEXA (modify frequency per FRAX score)  Addressed    Hepatitis A vaccine  Aged Out    Hib vaccine  Aged Out    Meningococcal (ACWY) vaccine  Aged Out     Patient is a 51-year-old white female who presents for hypertension, hyperlipidemia, impaired fasting glucose. She states she is taking and tolerating her routine medications. Results of recent labs discussed with patient including hemoglobin A1c of 5.9. She denies any fever, chills, chest pain, abdominal pain, shortness of breath. She has a good appetite and remains active. Review of Systems   Constitutional:  Negative for chills and fever. HENT:  Negative for congestion. Respiratory:  Negative for chest tightness and shortness of breath. Cardiovascular:  Negative for chest pain. Gastrointestinal:  Negative for abdominal pain and blood in stool. Genitourinary:  Negative for dysuria and hematuria. Skin:  Negative for rash. Neurological:  Negative for dizziness. Psychiatric/Behavioral:  Negative for confusion and dysphoric mood. Objective:   Physical Exam  Vitals and nursing note reviewed. Constitutional:       General: She is not in acute distress. Appearance: She is well-developed. HENT:      Head: Normocephalic and atraumatic. Right Ear: Tympanic membrane, ear canal and external ear normal.      Left Ear: Tympanic membrane, ear canal and external ear normal.      Nose: Nose normal.      Mouth/Throat:      Mouth: Mucous membranes are moist.      Pharynx: Oropharynx is clear. Eyes:      General: No scleral icterus. Right eye: No discharge. Left eye: No discharge. Conjunctiva/sclera: Conjunctivae normal.   Cardiovascular:      Rate and Rhythm: Normal rate and regular rhythm. Heart sounds: Normal heart sounds. Pulmonary:      Effort: Pulmonary effort is normal. No respiratory distress. Breath sounds: Normal breath sounds. No wheezing. Abdominal:      General: There is no distension. Palpations: Abdomen is soft. Tenderness: There is no abdominal tenderness. Musculoskeletal:      Cervical back: Neck supple. Skin:     General: Skin is warm and dry. Findings: No rash. Neurological:      Mental Status: She is alert and oriented to person, place, and time. Psychiatric:         Mood and Affect: Mood normal.         Behavior: Behavior normal.       Assessment:       Diagnosis Orders   1. Essential hypertension  ALT    AST    Basic Metabolic Panel    Lipid Panel    Magnesium      2. Mixed hyperlipidemia  ALT    AST    Basic Metabolic Panel    Lipid Panel      3. Impaired fasting glucose  Basic Metabolic Panel              Plan:      Orders Placed This Encounter   Procedures    ALT     Standing Status:   Future     Standing Expiration Date:   12/1/2023    AST     Standing Status:   Future     Standing Expiration Date:   66/6/8926    Basic Metabolic Panel     Fasting     Standing Status:   Future     Standing Expiration Date:   12/1/2023    Lipid Panel     Standing Status:   Future     Standing Expiration Date:   12/1/2023     Order Specific Question:   Is Patient Fasting?/# of Hours     Answer:    Fast 8-10 hours    Magnesium     Standing Status:   Future     Standing Expiration Date:   12/1/2023   Continue routine medications  Results of recent labs discussed with patient  Follow-up in 6 months or sooner if needed

## 2023-04-24 RX ORDER — LISINOPRIL AND HYDROCHLOROTHIAZIDE 12.5; 1 MG/1; MG/1
TABLET ORAL
Qty: 90 TABLET | Refills: 0 | Status: SHIPPED | OUTPATIENT
Start: 2023-04-24

## 2023-07-31 RX ORDER — LISINOPRIL AND HYDROCHLOROTHIAZIDE 12.5; 1 MG/1; MG/1
TABLET ORAL
Qty: 90 TABLET | Refills: 0 | Status: SHIPPED | OUTPATIENT
Start: 2023-07-31

## 2023-07-31 RX ORDER — ATORVASTATIN CALCIUM 10 MG/1
TABLET, FILM COATED ORAL
Qty: 90 TABLET | Refills: 0 | Status: SHIPPED | OUTPATIENT
Start: 2023-07-31

## 2023-10-30 RX ORDER — LISINOPRIL AND HYDROCHLOROTHIAZIDE 12.5; 1 MG/1; MG/1
TABLET ORAL
Qty: 90 TABLET | Refills: 0 | Status: SHIPPED | OUTPATIENT
Start: 2023-10-30

## 2023-10-30 RX ORDER — ATORVASTATIN CALCIUM 10 MG/1
TABLET, FILM COATED ORAL
Qty: 90 TABLET | Refills: 0 | Status: SHIPPED | OUTPATIENT
Start: 2023-10-30

## 2023-11-06 ENCOUNTER — HOSPITAL ENCOUNTER (OUTPATIENT)
Age: 78
Setting detail: SPECIMEN
Discharge: HOME | End: 2023-11-06

## 2023-11-06 DIAGNOSIS — I10 ESSENTIAL HYPERTENSION: ICD-10-CM

## 2023-11-06 DIAGNOSIS — R73.01 IMPAIRED FASTING GLUCOSE: ICD-10-CM

## 2023-11-06 DIAGNOSIS — E78.2 MIXED HYPERLIPIDEMIA: ICD-10-CM

## 2023-11-06 LAB
ALT SERPL-CCNC: 12 U/L (ref 5–33)
ANION GAP SERPL CALCULATED.3IONS-SCNC: 14 MMOL/L (ref 9–17)
AST SERPL-CCNC: 17 U/L
BUN SERPL-MCNC: 11 MG/DL (ref 8–23)
CALCIUM SERPL-MCNC: 9.3 MG/DL (ref 8.6–10.4)
CHLORIDE SERPL-SCNC: 96 MMOL/L (ref 98–107)
CHOLEST SERPL-MCNC: 194 MG/DL
CHOLESTEROL/HDL RATIO: 3.6
CO2 SERPL-SCNC: 26 MMOL/L (ref 20–31)
CREAT SERPL-MCNC: 0.6 MG/DL (ref 0.5–0.9)
GFR SERPL CREATININE-BSD FRML MDRD: >60 ML/MIN/1.73M2
GLUCOSE SERPL-MCNC: 90 MG/DL (ref 70–99)
HDLC SERPL-MCNC: 54 MG/DL
LDLC SERPL CALC-MCNC: 118 MG/DL (ref 0–130)
MAGNESIUM SERPL-MCNC: 2.1 MG/DL (ref 1.6–2.6)
POTASSIUM SERPL-SCNC: 3.4 MMOL/L (ref 3.7–5.3)
SODIUM SERPL-SCNC: 136 MMOL/L (ref 135–144)
TRIGL SERPL-MCNC: 110 MG/DL

## 2023-11-09 ENCOUNTER — OFFICE VISIT (OUTPATIENT)
Dept: FAMILY MEDICINE CLINIC | Age: 78
End: 2023-11-09

## 2023-11-09 VITALS
BODY MASS INDEX: 24.92 KG/M2 | DIASTOLIC BLOOD PRESSURE: 70 MMHG | HEIGHT: 59 IN | HEART RATE: 85 BPM | OXYGEN SATURATION: 97 % | RESPIRATION RATE: 14 BRPM | SYSTOLIC BLOOD PRESSURE: 146 MMHG | WEIGHT: 123.6 LBS

## 2023-11-09 DIAGNOSIS — E87.6 HYPOKALEMIA: ICD-10-CM

## 2023-11-09 DIAGNOSIS — R73.01 IMPAIRED FASTING GLUCOSE: ICD-10-CM

## 2023-11-09 DIAGNOSIS — E78.2 MIXED HYPERLIPIDEMIA: ICD-10-CM

## 2023-11-09 DIAGNOSIS — I10 ESSENTIAL HYPERTENSION: Primary | ICD-10-CM

## 2023-11-09 RX ORDER — POTASSIUM CHLORIDE 20 MEQ/1
20 TABLET, EXTENDED RELEASE ORAL DAILY
Qty: 12 TABLET | Refills: 0 | Status: SHIPPED | OUTPATIENT
Start: 2023-11-09

## 2023-11-09 SDOH — ECONOMIC STABILITY: FOOD INSECURITY: WITHIN THE PAST 12 MONTHS, THE FOOD YOU BOUGHT JUST DIDN'T LAST AND YOU DIDN'T HAVE MONEY TO GET MORE.: NEVER TRUE

## 2023-11-09 SDOH — ECONOMIC STABILITY: INCOME INSECURITY: HOW HARD IS IT FOR YOU TO PAY FOR THE VERY BASICS LIKE FOOD, HOUSING, MEDICAL CARE, AND HEATING?: NOT HARD AT ALL

## 2023-11-09 SDOH — ECONOMIC STABILITY: HOUSING INSECURITY
IN THE LAST 12 MONTHS, WAS THERE A TIME WHEN YOU DID NOT HAVE A STEADY PLACE TO SLEEP OR SLEPT IN A SHELTER (INCLUDING NOW)?: NO

## 2023-11-09 SDOH — ECONOMIC STABILITY: FOOD INSECURITY: WITHIN THE PAST 12 MONTHS, YOU WORRIED THAT YOUR FOOD WOULD RUN OUT BEFORE YOU GOT MONEY TO BUY MORE.: NEVER TRUE

## 2023-11-09 ASSESSMENT — PATIENT HEALTH QUESTIONNAIRE - PHQ9
SUM OF ALL RESPONSES TO PHQ QUESTIONS 1-9: 0
SUM OF ALL RESPONSES TO PHQ QUESTIONS 1-9: 0
SUM OF ALL RESPONSES TO PHQ9 QUESTIONS 1 & 2: 0
SUM OF ALL RESPONSES TO PHQ QUESTIONS 1-9: 0
SUM OF ALL RESPONSES TO PHQ QUESTIONS 1-9: 0
2. FEELING DOWN, DEPRESSED OR HOPELESS: 0
1. LITTLE INTEREST OR PLEASURE IN DOING THINGS: 0

## 2023-11-09 ASSESSMENT — ENCOUNTER SYMPTOMS
SHORTNESS OF BREATH: 0
CHEST TIGHTNESS: 0
ABDOMINAL PAIN: 0
BLOOD IN STOOL: 0

## 2023-11-09 NOTE — PROGRESS NOTES
Subjective:      Patient ID: Alma Loomis is a 70 y.o. female. HPI  Visit Information    Have you changed or started any medications since your last visit including any over-the-counter medicines, vitamins, or herbal medicines? no   Are you having any side effects from any of your medications? -  no  Have you stopped taking any of your medications? Is so, why? -  no    Have you seen any other physician or provider since your last visit? No  Have you had any other diagnostic tests since your last visit? Yes - Records Obtained  Have you been seen in the emergency room and/or had an admission to a hospital since we last saw you? No  Have you had your routine dental cleaning in the past 6 months? yes -     Have you activated your [employer] account? If not, what are your barriers?  Yes     Patient Care Team:  José Miguel Barreto MD as PCP - General (Family Medicine)  José Miguel Barreto MD as PCP - Empaneled Provider  Duyen Romo MD (Neurosurgery)  Easton Hudson MD (Dermatology)  José Miguel Barreto MD as Referring Physician (Family Medicine)    Medical History Review  Past Medical, Family, and Social History reviewed and does contribute to the patient presenting condition    Health Maintenance   Topic Date Due    Hepatitis C screen  Never done    DTaP/Tdap/Td vaccine (1 - Tdap) Never done    Shingles vaccine (1 of 2) Never done    Annual Wellness Visit (AWV)  Never done    Flu vaccine (1) Never done    COVID-19 Vaccine (4 - 2023-24 season) 09/01/2023    Depression Screen  12/01/2023    Pneumococcal 65+ years Vaccine (1 - PCV) 04/19/2024 (Originally 6/1/2012)    Lipids  11/06/2024    DEXA (modify frequency per FRAX score)  Addressed    Hepatitis A vaccine  Aged Out    Hepatitis B vaccine  Aged Out    Hib vaccine  Aged Out    Meningococcal (ACWY) vaccine  Aged Out    A1C test (Diabetic or Prediabetic)  Discontinued    Breast cancer screen  Discontinued    Colorectal Cancer Screen  Discontinued

## 2023-11-27 ENCOUNTER — HOSPITAL ENCOUNTER (OUTPATIENT)
Age: 78
Setting detail: SPECIMEN
Discharge: HOME | End: 2023-11-27

## 2023-11-27 DIAGNOSIS — E87.6 HYPOKALEMIA: ICD-10-CM

## 2023-11-27 LAB — POTASSIUM SERPL-SCNC: 4.3 MMOL/L (ref 3.7–5.3)

## 2023-11-29 ENCOUNTER — TELEPHONE (OUTPATIENT)
Dept: FAMILY MEDICINE CLINIC | Age: 78
End: 2023-11-29

## 2023-11-29 DIAGNOSIS — E78.2 MIXED HYPERLIPIDEMIA: ICD-10-CM

## 2023-11-29 DIAGNOSIS — I10 ESSENTIAL HYPERTENSION: Primary | ICD-10-CM

## 2023-11-29 DIAGNOSIS — R73.01 IMPAIRED FASTING GLUCOSE: ICD-10-CM

## 2023-11-29 NOTE — TELEPHONE ENCOUNTER
Patient is asking for routine lab orders for next appointment on 5/23/24. Her daughter, Laurita Holter, will pick them up on Friday 12/1/23.

## 2024-02-05 RX ORDER — ATORVASTATIN CALCIUM 10 MG/1
TABLET, FILM COATED ORAL
Qty: 90 TABLET | Refills: 1 | Status: SHIPPED | OUTPATIENT
Start: 2024-02-05

## 2024-02-05 RX ORDER — LISINOPRIL AND HYDROCHLOROTHIAZIDE 12.5; 1 MG/1; MG/1
TABLET ORAL
Qty: 90 TABLET | Refills: 1 | Status: SHIPPED | OUTPATIENT
Start: 2024-02-05

## 2024-05-10 ENCOUNTER — HOSPITAL ENCOUNTER (EMERGENCY)
Age: 79
Discharge: HOME | End: 2024-05-10
Attending: EMERGENCY MEDICINE
Payer: MEDICARE

## 2024-05-10 ENCOUNTER — APPOINTMENT (OUTPATIENT)
Dept: CT IMAGING | Age: 79
End: 2024-05-10
Payer: MEDICARE

## 2024-05-10 VITALS
TEMPERATURE: 99.5 F | DIASTOLIC BLOOD PRESSURE: 52 MMHG | OXYGEN SATURATION: 94 % | WEIGHT: 115 LBS | SYSTOLIC BLOOD PRESSURE: 109 MMHG | HEART RATE: 85 BPM | BODY MASS INDEX: 24.81 KG/M2 | RESPIRATION RATE: 16 BRPM | HEIGHT: 57 IN

## 2024-05-10 DIAGNOSIS — K80.20 CALCULUS OF GALLBLADDER WITHOUT CHOLECYSTITIS WITHOUT OBSTRUCTION: ICD-10-CM

## 2024-05-10 DIAGNOSIS — N12 PYELONEPHRITIS: Primary | ICD-10-CM

## 2024-05-10 LAB
ANION GAP SERPL CALCULATED.3IONS-SCNC: 13 MMOL/L (ref 9–17)
BACTERIA URNS QL MICRO: ABNORMAL
BASOPHILS # BLD: 0.1 K/UL (ref 0–0.2)
BASOPHILS NFR BLD: 0 % (ref 0–2)
BILIRUB UR QL STRIP: NEGATIVE
BUN SERPL-MCNC: 16 MG/DL (ref 8–23)
CALCIUM SERPL-MCNC: 9.4 MG/DL (ref 8.6–10.4)
CHARACTER UR: ABNORMAL
CHLORIDE SERPL-SCNC: 95 MMOL/L (ref 98–107)
CLARITY UR: ABNORMAL
CO2 SERPL-SCNC: 27 MMOL/L (ref 20–31)
COLOR UR: YELLOW
COMMENT: ABNORMAL
CREAT SERPL-MCNC: 0.8 MG/DL (ref 0.5–0.9)
EOSINOPHIL # BLD: 0 K/UL (ref 0–0.4)
EOSINOPHILS RELATIVE PERCENT: 0 % (ref 1–4)
EPI CELLS #/AREA URNS HPF: ABNORMAL /HPF (ref 0–5)
ERYTHROCYTE [DISTWIDTH] IN BLOOD BY AUTOMATED COUNT: 13.5 % (ref 12.5–15.4)
GFR, ESTIMATED: 76 ML/MIN/1.73M2
GLUCOSE SERPL-MCNC: 126 MG/DL (ref 70–99)
GLUCOSE UR STRIP-MCNC: NEGATIVE MG/DL
HCT VFR BLD AUTO: 44.6 % (ref 36–46)
HGB BLD-MCNC: 14.9 G/DL (ref 12–16)
HGB UR QL STRIP.AUTO: ABNORMAL
KETONES UR STRIP-MCNC: ABNORMAL MG/DL
LEUKOCYTE ESTERASE UR QL STRIP: ABNORMAL
LYMPHOCYTES NFR BLD: 0.5 K/UL (ref 1–4.8)
LYMPHOCYTES RELATIVE PERCENT: 4 % (ref 24–44)
MCH RBC QN AUTO: 29.6 PG (ref 26–34)
MCHC RBC AUTO-ENTMCNC: 33.4 G/DL (ref 31–37)
MCV RBC AUTO: 88.7 FL (ref 80–100)
MONOCYTES NFR BLD: 0.8 K/UL (ref 0.1–1.2)
MONOCYTES NFR BLD: 6 % (ref 2–11)
NEUTROPHILS NFR BLD: 90 % (ref 36–66)
NEUTS SEG NFR BLD: 12.1 K/UL (ref 1.8–7.7)
NITRITE UR QL STRIP: POSITIVE
PH UR STRIP: 5.5 [PH] (ref 5–8)
PLATELET # BLD AUTO: 236 K/UL (ref 140–450)
PMV BLD AUTO: 8.9 FL (ref 6–12)
POTASSIUM SERPL-SCNC: 3.5 MMOL/L (ref 3.7–5.3)
PROT UR STRIP-MCNC: ABNORMAL MG/DL
RBC # BLD AUTO: 5.03 M/UL (ref 4–5.2)
RBC #/AREA URNS HPF: ABNORMAL /HPF (ref 0–2)
SODIUM SERPL-SCNC: 135 MMOL/L (ref 135–144)
SP GR UR STRIP: 1.02 (ref 1–1.03)
UROBILINOGEN UR STRIP-ACNC: NORMAL EU/DL (ref 0–1)
WBC #/AREA URNS HPF: ABNORMAL /HPF (ref 0–5)
WBC OTHER # BLD: 13.4 K/UL (ref 3.5–11)

## 2024-05-10 PROCEDURE — 96361 HYDRATE IV INFUSION ADD-ON: CPT | Performed by: EMERGENCY MEDICINE

## 2024-05-10 PROCEDURE — 80048 BASIC METABOLIC PNL TOTAL CA: CPT

## 2024-05-10 PROCEDURE — 96374 THER/PROPH/DIAG INJ IV PUSH: CPT | Performed by: EMERGENCY MEDICINE

## 2024-05-10 PROCEDURE — 36415 COLL VENOUS BLD VENIPUNCTURE: CPT

## 2024-05-10 PROCEDURE — 74176 CT ABD & PELVIS W/O CONTRAST: CPT

## 2024-05-10 PROCEDURE — 85025 COMPLETE CBC W/AUTO DIFF WBC: CPT

## 2024-05-10 PROCEDURE — 81001 URINALYSIS AUTO W/SCOPE: CPT

## 2024-05-10 PROCEDURE — 2580000003 HC RX 258: Performed by: REGISTERED NURSE

## 2024-05-10 PROCEDURE — 99284 EMERGENCY DEPT VISIT MOD MDM: CPT | Performed by: EMERGENCY MEDICINE

## 2024-05-10 PROCEDURE — 6360000002 HC RX W HCPCS: Performed by: REGISTERED NURSE

## 2024-05-10 RX ORDER — 0.9 % SODIUM CHLORIDE 0.9 %
500 INTRAVENOUS SOLUTION INTRAVENOUS ONCE
Status: COMPLETED | OUTPATIENT
Start: 2024-05-10 | End: 2024-05-10

## 2024-05-10 RX ORDER — CIPROFLOXACIN 500 MG/5ML
500 KIT ORAL 2 TIMES DAILY
Qty: 100 ML | Refills: 0 | Status: SHIPPED | OUTPATIENT
Start: 2024-05-10 | End: 2024-05-10

## 2024-05-10 RX ORDER — SULFAMETHOXAZOLE AND TRIMETHOPRIM 200; 40 MG/5ML; MG/5ML
800 SUSPENSION ORAL 2 TIMES DAILY
Qty: 2000 ML | Refills: 0 | Status: SHIPPED | OUTPATIENT
Start: 2024-05-10 | End: 2024-05-20

## 2024-05-10 RX ORDER — KETOROLAC TROMETHAMINE 15 MG/ML
15 INJECTION, SOLUTION INTRAMUSCULAR; INTRAVENOUS ONCE
Status: COMPLETED | OUTPATIENT
Start: 2024-05-10 | End: 2024-05-10

## 2024-05-10 RX ADMIN — SODIUM CHLORIDE 500 ML: 9 INJECTION, SOLUTION INTRAVENOUS at 11:24

## 2024-05-10 RX ADMIN — KETOROLAC TROMETHAMINE 15 MG: 15 INJECTION, SOLUTION INTRAMUSCULAR; INTRAVENOUS at 11:28

## 2024-05-10 ASSESSMENT — ENCOUNTER SYMPTOMS
BACK PAIN: 0
DIARRHEA: 0
COUGH: 0
NAUSEA: 0
SHORTNESS OF BREATH: 0
ABDOMINAL PAIN: 1
VOMITING: 0

## 2024-05-10 ASSESSMENT — PAIN DESCRIPTION - LOCATION: LOCATION: ABDOMEN;FLANK

## 2024-05-10 ASSESSMENT — PAIN - FUNCTIONAL ASSESSMENT
PAIN_FUNCTIONAL_ASSESSMENT: NONE - DENIES PAIN
PAIN_FUNCTIONAL_ASSESSMENT: 0-10

## 2024-05-10 ASSESSMENT — PAIN SCALES - GENERAL: PAINLEVEL_OUTOF10: 4

## 2024-05-10 ASSESSMENT — PAIN DESCRIPTION - ORIENTATION: ORIENTATION: RIGHT

## 2024-05-10 NOTE — ED PROVIDER NOTES
[facility] EMERGENCY DEPARTMENT  eMERGENCY dEPARTMENT eNCOUnter   Independent Attestation     Pt Name: Deyanira Mendoza  MRN: [id number]  Birthdate 1945  Date of evaluation: 5/10/24       Deyanira Mendoza is a 78 y.o. female who presents with Flank Pain and Abdominal Pain (Rt side onset W- denies injury. HA and rt abd flank )        Based on the medical record, the care appears appropriate. I was personally available for consultation in the Emergency Department.    Peter Burnham DO  Attending Emergency  Physician                Peter Burnham DO  05/10/24 [id number]

## 2024-05-10 NOTE — DISCHARGE INSTRUCTIONS
PLEASE RETURN TO THE EMERGENCY DEPARTMENT IMMEDIATELY if your symptoms worsen in anyway or in 1-2 days if not improved for re-evaluation.  You should immediately return to the ER for symptoms such as abdominal or back pain, fever, a feeling of passing out, light headed, dizziness, chest pain, shortness of breath, persistent nausea and/or vomiting, numbness or weakness to the arms or legs, coolness or color change of the arms or legs.      Take your medication as indicated and prescribed.  If you are given an antibiotic then, make sure you get the prescription filled and take the antibiotics until finished.  You should encourage fluids.    Please understand that at this time there is no evidence for a more serious underlying process, but that early in the process of an illness or injury, an emergency department workup can be falsely reassuring.  You should contact your family doctor within the next 24 hours for a follow up appointment    THANK YOU!!!    From [facility] and [facility]    On behalf of the Emergency Department staff at [facility], I would like to thank you for giving us the opportunity to address your health care needs and concerns.    We hope that during your visit, our service was delivered in a professional and caring manner. Please keep [facility] in mind as we walk with you down the path to your own personal wellness.     Please expect an automated text message or email from us so we can ask a few questions about your health and progress. Based on your answers, a clinician may call you back to offer help and instructions.    Please understand that early in the process of an illness or injury, an emergency department workup can be falsely reassuring.  If you notice any worsening, changing or persistent symptoms please call your family doctor or return to the ER immediately.     Tell us how we did during your visit at [email or web address]   and let us know about

## 2024-05-10 NOTE — ED PROVIDER NOTES
[facility] Emergency Department  [id number]  [place] [address]  Phone: [phone]  Fax: [phone]        Pt Name: Deyanira Mendoza  MRN: [id number]  Birthdate 1945  Date of evaluation: 5/10/24    CHIEFCOMPLAINT       Chief Complaint   Patient presents with    Flank Pain    Abdominal Pain     Rt side onset W- denies injury. HA and rt abd flank        HISTORY OF PRESENT ILLNESS (Location/Symptom, Timing/Onset, Context/Setting, Quality, Duration, Modifying Factors, Severity)      Deyanira Mendoza is a 78 y.o. female  who presents to the ED via private auto with right-sided abdominal pain and right flank pain ongoing for last 2 days.  Patient Nuys any fevers, chills, chest pain, shortness of breath, nausea vomiting or diarrhea.  She has not taken medications for symptoms.  She states that nothing makes her symptoms better or worse.  She states she does have a history of kidney stones and states she is concerned for developing another kidney stone.  On arrival she is resting in chair comfortably with even unlabored breaths and nontoxic.  No acute distress noted.    PAST MEDICAL / SURGICAL / SOCIAL / FAMILY HISTORY     PMH:  has a past medical history of Cervical myelopathy (HCC), Herpes simplex, History of anxiety, HTN (hypertension), and Hyperlipidemia.  Surgical History:  has a past surgical history that includes Tonsillectomy () and Hysterectomy ().  Social History:  reports that she has never smoked. She has never used smokeless tobacco. She reports that she does not drink alcohol.  Family History: She indicated that her mother is .   family history includes High Blood Pressure in her mother.  Psychiatric History: None    Allergies: Patient has no known allergies.    Home Medications:   Prior to Admission medications    Medication Sig Start Date End Date Taking? Authorizing Provider   sulfamethoxazole-trimethoprim (BACTRIM;SEPTRA) 200-40 MG/5ML suspension

## 2024-05-10 NOTE — ED NOTES
Pt ambulates to room- gait steady. Pt states onset of rt flank pain on Wed- wrapping to abd, down rt leg. Pt reports Hx of kidney stone 4 years ago- feels similar. Pt reports feeling dehydrated d/t not drinking much water. Pt AOx4. RR veasy,unlabored. PWD. Family accompanies

## 2024-07-29 RX ORDER — LISINOPRIL AND HYDROCHLOROTHIAZIDE 12.5; 1 MG/1; MG/1
TABLET ORAL
Qty: 90 TABLET | Refills: 0 | Status: SHIPPED | OUTPATIENT
Start: 2024-07-29

## 2024-07-30 RX ORDER — ATORVASTATIN CALCIUM 10 MG/1
TABLET, FILM COATED ORAL
Qty: 90 TABLET | Refills: 0 | Status: SHIPPED | OUTPATIENT
Start: 2024-07-30

## 2024-10-10 ENCOUNTER — PATIENT MESSAGE (OUTPATIENT)
Dept: FAMILY MEDICINE CLINIC | Age: 79
End: 2024-10-10

## 2024-10-21 ENCOUNTER — HOSPITAL ENCOUNTER (OUTPATIENT)
Age: 79
Setting detail: SPECIMEN
Discharge: HOME | End: 2024-10-21

## 2024-10-21 DIAGNOSIS — R73.01 IMPAIRED FASTING GLUCOSE: ICD-10-CM

## 2024-10-21 DIAGNOSIS — E78.2 MIXED HYPERLIPIDEMIA: ICD-10-CM

## 2024-10-21 DIAGNOSIS — I10 ESSENTIAL HYPERTENSION: ICD-10-CM

## 2024-10-21 LAB
ALBUMIN SERPL-MCNC: 4.2 G/DL (ref 3.5–5.2)
ALBUMIN/GLOB SERPL: 1 {RATIO} (ref 1–2.5)
ALP SERPL-CCNC: 68 U/L (ref 35–104)
ALT SERPL-CCNC: 11 U/L (ref 10–35)
ANION GAP SERPL CALCULATED.3IONS-SCNC: 10 MMOL/L (ref 9–16)
AST SERPL-CCNC: 15 U/L (ref 10–35)
BASOPHILS # BLD: 0.05 K/UL (ref 0–0.2)
BASOPHILS NFR BLD: 1 % (ref 0–2)
BILIRUB SERPL-MCNC: 0.5 MG/DL (ref 0–1.2)
BUN SERPL-MCNC: 10 MG/DL (ref 8–23)
CALCIUM SERPL-MCNC: 9.5 MG/DL (ref 8.6–10.4)
CHLORIDE SERPL-SCNC: 100 MMOL/L (ref 98–107)
CHOLEST SERPL-MCNC: 222 MG/DL (ref 0–199)
CHOLESTEROL/HDL RATIO: 4
CO2 SERPL-SCNC: 28 MMOL/L (ref 20–31)
CREAT SERPL-MCNC: 0.6 MG/DL (ref 0.5–0.9)
EOSINOPHIL # BLD: 0.17 K/UL (ref 0–0.44)
EOSINOPHILS RELATIVE PERCENT: 2 % (ref 1–4)
ERYTHROCYTE [DISTWIDTH] IN BLOOD BY AUTOMATED COUNT: 13.2 % (ref 11.8–14.4)
EST. AVERAGE GLUCOSE BLD GHB EST-MCNC: 120 MG/DL
GFR, ESTIMATED: >90 ML/MIN/1.73M2
GLUCOSE SERPL-MCNC: 85 MG/DL (ref 74–99)
HBA1C MFR BLD: 5.8 % (ref 4–6)
HCT VFR BLD AUTO: 46.1 % (ref 36.3–47.1)
HDLC SERPL-MCNC: 57 MG/DL
HGB BLD-MCNC: 14.8 G/DL (ref 11.9–15.1)
IMM GRANULOCYTES # BLD AUTO: 0.04 K/UL (ref 0–0.3)
IMM GRANULOCYTES NFR BLD: 0 %
LDLC SERPL CALC-MCNC: 141 MG/DL (ref 0–100)
LYMPHOCYTES NFR BLD: 2.33 K/UL (ref 1.1–3.7)
LYMPHOCYTES RELATIVE PERCENT: 26 % (ref 24–43)
MAGNESIUM SERPL-MCNC: 2.5 MG/DL (ref 1.6–2.4)
MCH RBC QN AUTO: 29.6 PG (ref 25.2–33.5)
MCHC RBC AUTO-ENTMCNC: 32.1 G/DL (ref 28.4–34.8)
MCV RBC AUTO: 92.2 FL (ref 82.6–102.9)
MONOCYTES NFR BLD: 0.72 K/UL (ref 0.1–1.2)
MONOCYTES NFR BLD: 8 % (ref 3–12)
NEUTROPHILS NFR BLD: 63 % (ref 36–65)
NEUTS SEG NFR BLD: 5.65 K/UL (ref 1.5–8.1)
NRBC BLD-RTO: 0 PER 100 WBC
PLATELET # BLD AUTO: 277 K/UL (ref 138–453)
PMV BLD AUTO: 11.7 FL (ref 8.1–13.5)
POTASSIUM SERPL-SCNC: 4.3 MMOL/L (ref 3.7–5.3)
PROT SERPL-MCNC: 7.4 G/DL (ref 6.6–8.7)
RBC # BLD AUTO: 5 M/UL (ref 3.95–5.11)
SODIUM SERPL-SCNC: 138 MMOL/L (ref 136–145)
TRIGL SERPL-MCNC: 121 MG/DL
TSH SERPL DL<=0.05 MIU/L-ACNC: 1.38 UIU/ML (ref 0.27–4.2)
VLDLC SERPL CALC-MCNC: 24 MG/DL
WBC OTHER # BLD: 9 K/UL (ref 3.5–11.3)

## 2024-10-23 RX ORDER — LISINOPRIL AND HYDROCHLOROTHIAZIDE 10; 12.5 MG/1; MG/1
TABLET ORAL
Qty: 90 TABLET | Refills: 0 | Status: SHIPPED | OUTPATIENT
Start: 2024-10-23

## 2024-10-25 ENCOUNTER — OFFICE VISIT (OUTPATIENT)
Dept: FAMILY MEDICINE CLINIC | Age: 79
End: 2024-10-25

## 2024-10-25 VITALS
DIASTOLIC BLOOD PRESSURE: 78 MMHG | BODY MASS INDEX: 25.79 KG/M2 | HEART RATE: 78 BPM | WEIGHT: 119.2 LBS | RESPIRATION RATE: 14 BRPM | SYSTOLIC BLOOD PRESSURE: 136 MMHG | OXYGEN SATURATION: 98 %

## 2024-10-25 DIAGNOSIS — E78.2 MIXED HYPERLIPIDEMIA: ICD-10-CM

## 2024-10-25 DIAGNOSIS — K82.8 PORCELAIN GALLBLADDER: ICD-10-CM

## 2024-10-25 DIAGNOSIS — E87.6 HYPOKALEMIA: ICD-10-CM

## 2024-10-25 DIAGNOSIS — R73.01 IMPAIRED FASTING GLUCOSE: ICD-10-CM

## 2024-10-25 DIAGNOSIS — K80.20 CALCULUS OF GALLBLADDER WITHOUT CHOLECYSTITIS WITHOUT OBSTRUCTION: ICD-10-CM

## 2024-10-25 DIAGNOSIS — E83.41 HYPERMAGNESEMIA: ICD-10-CM

## 2024-10-25 DIAGNOSIS — K80.20 CALCULUS OF GALLBLADDER WITHOUT CHOLECYSTITIS WITHOUT OBSTRUCTION: Primary | ICD-10-CM

## 2024-10-25 DIAGNOSIS — I10 ESSENTIAL HYPERTENSION: Primary | ICD-10-CM

## 2024-10-25 SDOH — ECONOMIC STABILITY: INCOME INSECURITY: HOW HARD IS IT FOR YOU TO PAY FOR THE VERY BASICS LIKE FOOD, HOUSING, MEDICAL CARE, AND HEATING?: NOT HARD AT ALL

## 2024-10-25 SDOH — ECONOMIC STABILITY: FOOD INSECURITY: WITHIN THE PAST 12 MONTHS, THE FOOD YOU BOUGHT JUST DIDN'T LAST AND YOU DIDN'T HAVE MONEY TO GET MORE.: NEVER TRUE

## 2024-10-25 SDOH — ECONOMIC STABILITY: FOOD INSECURITY: WITHIN THE PAST 12 MONTHS, YOU WORRIED THAT YOUR FOOD WOULD RUN OUT BEFORE YOU GOT MONEY TO BUY MORE.: NEVER TRUE

## 2024-10-25 ASSESSMENT — PATIENT HEALTH QUESTIONNAIRE - PHQ9
SUM OF ALL RESPONSES TO PHQ QUESTIONS 1-9: 0
SUM OF ALL RESPONSES TO PHQ QUESTIONS 1-9: 0
1. LITTLE INTEREST OR PLEASURE IN DOING THINGS: NOT AT ALL
2. FEELING DOWN, DEPRESSED OR HOPELESS: NOT AT ALL
SUM OF ALL RESPONSES TO PHQ9 QUESTIONS 1 & 2: 0
SUM OF ALL RESPONSES TO PHQ QUESTIONS 1-9: 0
SUM OF ALL RESPONSES TO PHQ QUESTIONS 1-9: 0

## 2024-10-25 ASSESSMENT — ENCOUNTER SYMPTOMS
CHEST TIGHTNESS: 0
ABDOMINAL PAIN: 0
BLOOD IN STOOL: 0
SHORTNESS OF BREATH: 0

## 2024-10-25 NOTE — PROGRESS NOTES
[facility]      Date of Visit:  10/25/2024  Patient Name: Deyanira Mendoza   Patient :  1945     CHIEF COMPLAINT:     Deyanira Mendoza is a 79 y.o. female who presents today for an general visit to be evaluated for the following condition(s):  Chief Complaint   Patient presents with    Hypertension    IFG    Hyperlipidemia    ER VISITS     10-12 [facility] ER- EAR INFECTION/EUST TUBE DYSFUNCTION    HAS APPT WITH DR ORTIZ ON MONDAY       HISTORY OF PRESENT ILLNESS:       HPI   Visit Information    Have you changed or started any medications since your last visit including any over-the-counter medicines, vitamins, or herbal medicines? no   Are you having any side effects from any of your medications? -  no  Have you stopped taking any of your medications? Is so, why? -  no    Have you seen any other physician or provider since your last visit? Yes - Records Obtained  Have you had any other diagnostic tests since your last visit? Yes - Records Obtained  Have you been seen in the emergency room and/or had an admission to a hospital since we last saw you? Yes - Records Obtained  Have you had your routine dental cleaning in the past 6 months? yes -     Have you activated your [employer] account? If not, what are your barriers? Yes     Patient Care Team:  Don De La Rosa MD as PCP - General (Family Medicine)  Don De La Rosa MD as PCP - Empaneled Provider  Jose Raygoza MD (Neurosurgery)  Giacomo Sung MD (Dermatology)  Don De La Rosa MD as Referring Physician (Family Medicine)    Medical History Review  Past Medical, Family, and Social History reviewed and does contribute to the patient presenting condition    Health Maintenance   Topic Date Due    Hepatitis C screen  Never done    DTaP/Tdap/Td vaccine (1 - Tdap) Never done    Shingles vaccine (1 of 2) Never done    Respiratory Syncytial Virus (RSV) Pregnant or age 60 yrs+ (1 - 1-dose 60+ series) Never done    Pneumococcal 65+

## 2024-10-28 RX ORDER — ATORVASTATIN CALCIUM 10 MG/1
TABLET, FILM COATED ORAL
Qty: 90 TABLET | Refills: 0 | Status: SHIPPED | OUTPATIENT
Start: 2024-10-28

## 2024-12-09 ENCOUNTER — HOSPITAL ENCOUNTER (OUTPATIENT)
Age: 79
Setting detail: SPECIMEN
Discharge: HOME | End: 2024-12-09

## 2024-12-09 DIAGNOSIS — E83.41 HYPERMAGNESEMIA: ICD-10-CM

## 2024-12-09 LAB — MAGNESIUM SERPL-MCNC: 2.2 MG/DL (ref 1.6–2.4)

## 2025-01-20 RX ORDER — LISINOPRIL AND HYDROCHLOROTHIAZIDE 10; 12.5 MG/1; MG/1
TABLET ORAL
Qty: 90 TABLET | Refills: 0 | Status: SHIPPED | OUTPATIENT
Start: 2025-01-20

## 2025-02-06 RX ORDER — ATORVASTATIN CALCIUM 10 MG/1
TABLET, FILM COATED ORAL
Qty: 90 TABLET | Refills: 0 | Status: SHIPPED | OUTPATIENT
Start: 2025-02-06

## 2025-04-17 RX ORDER — LISINOPRIL AND HYDROCHLOROTHIAZIDE 10; 12.5 MG/1; MG/1
1 TABLET ORAL DAILY
Qty: 90 TABLET | Refills: 0 | Status: SHIPPED | OUTPATIENT
Start: 2025-04-17

## 2025-04-21 ENCOUNTER — TELEPHONE (OUTPATIENT)
Dept: FAMILY MEDICINE CLINIC | Age: 80
End: 2025-04-21

## 2025-04-21 DIAGNOSIS — R73.01 IMPAIRED FASTING GLUCOSE: ICD-10-CM

## 2025-04-21 DIAGNOSIS — Z11.59 NEED FOR HEPATITIS C SCREENING TEST: ICD-10-CM

## 2025-04-21 DIAGNOSIS — E78.2 MIXED HYPERLIPIDEMIA: ICD-10-CM

## 2025-04-21 DIAGNOSIS — I10 ESSENTIAL HYPERTENSION: Primary | ICD-10-CM

## 2025-04-21 NOTE — TELEPHONE ENCOUNTER
PT HAD TO RESCHEDULE HER FRI APPT TILL JUNE, REQUESTING TO HAVE LAB ORDERS DONE BEFORE THAT APPT. WOULD LIKE ORDERS PLACED AND THEN MAILED TO HER HOME.     PLEASE ADVISE.

## 2025-06-23 ENCOUNTER — HOSPITAL ENCOUNTER (OUTPATIENT)
Age: 80
Setting detail: SPECIMEN
Discharge: HOME | End: 2025-06-23

## 2025-06-23 DIAGNOSIS — E78.2 MIXED HYPERLIPIDEMIA: ICD-10-CM

## 2025-06-23 DIAGNOSIS — I10 ESSENTIAL HYPERTENSION: ICD-10-CM

## 2025-06-23 DIAGNOSIS — Z11.59 NEED FOR HEPATITIS C SCREENING TEST: ICD-10-CM

## 2025-06-23 DIAGNOSIS — R73.01 IMPAIRED FASTING GLUCOSE: ICD-10-CM

## 2025-06-23 LAB
ALBUMIN SERPL-MCNC: 4.1 G/DL (ref 3.5–5.2)
ALBUMIN/GLOB SERPL: 1.2 {RATIO} (ref 1–2.5)
ALP SERPL-CCNC: 77 U/L (ref 35–104)
ALT SERPL-CCNC: 13 U/L (ref 10–35)
ANION GAP SERPL CALCULATED.3IONS-SCNC: 11 MMOL/L (ref 9–16)
AST SERPL-CCNC: 19 U/L (ref 10–35)
BILIRUB SERPL-MCNC: 0.5 MG/DL (ref 0–1.2)
BUN SERPL-MCNC: 10 MG/DL (ref 8–23)
CALCIUM SERPL-MCNC: 9.3 MG/DL (ref 8.6–10.4)
CHLORIDE SERPL-SCNC: 103 MMOL/L (ref 98–107)
CHOLEST SERPL-MCNC: 230 MG/DL (ref 0–199)
CHOLESTEROL/HDL RATIO: 4.3
CO2 SERPL-SCNC: 25 MMOL/L (ref 20–31)
CREAT SERPL-MCNC: 0.7 MG/DL (ref 0.6–0.9)
GFR, ESTIMATED: 88 ML/MIN/1.73M2
GLUCOSE SERPL-MCNC: 95 MG/DL (ref 74–99)
HCV AB SERPL QL IA: NONREACTIVE
HDLC SERPL-MCNC: 53 MG/DL
LDLC SERPL CALC-MCNC: 153 MG/DL (ref 0–100)
MAGNESIUM SERPL-MCNC: 2.2 MG/DL (ref 1.6–2.4)
POTASSIUM SERPL-SCNC: 4.5 MMOL/L (ref 3.7–5.3)
PROT SERPL-MCNC: 7.6 G/DL (ref 6.6–8.7)
SODIUM SERPL-SCNC: 139 MMOL/L (ref 136–145)
TRIGL SERPL-MCNC: 121 MG/DL
VLDLC SERPL CALC-MCNC: 24 MG/DL (ref 1–30)

## 2025-06-24 ENCOUNTER — RESULTS FOLLOW-UP (OUTPATIENT)
Dept: FAMILY MEDICINE CLINIC | Age: 80
End: 2025-06-24

## 2025-06-24 DIAGNOSIS — E78.2 MIXED HYPERLIPIDEMIA: Primary | ICD-10-CM

## 2025-06-24 RX ORDER — EZETIMIBE 10 MG/1
10 TABLET ORAL DAILY
Qty: 30 TABLET | Refills: 5 | Status: SHIPPED | OUTPATIENT
Start: 2025-06-24

## 2025-06-24 RX ORDER — ATORVASTATIN CALCIUM 20 MG/1
TABLET, FILM COATED ORAL
Qty: 90 TABLET | Refills: 1 | Status: SHIPPED | OUTPATIENT
Start: 2025-06-24 | End: 2025-06-24 | Stop reason: SINTOL

## 2025-06-27 ENCOUNTER — OFFICE VISIT (OUTPATIENT)
Dept: FAMILY MEDICINE CLINIC | Age: 80
End: 2025-06-27

## 2025-06-27 VITALS
OXYGEN SATURATION: 97 % | DIASTOLIC BLOOD PRESSURE: 74 MMHG | HEIGHT: 59 IN | HEART RATE: 68 BPM | WEIGHT: 123.4 LBS | SYSTOLIC BLOOD PRESSURE: 122 MMHG | BODY MASS INDEX: 24.88 KG/M2 | RESPIRATION RATE: 14 BRPM

## 2025-06-27 DIAGNOSIS — E87.6 HYPOKALEMIA: ICD-10-CM

## 2025-06-27 DIAGNOSIS — R73.01 IMPAIRED FASTING GLUCOSE: ICD-10-CM

## 2025-06-27 DIAGNOSIS — E78.2 MIXED HYPERLIPIDEMIA: ICD-10-CM

## 2025-06-27 DIAGNOSIS — I10 ESSENTIAL HYPERTENSION: Primary | ICD-10-CM

## 2025-06-27 SDOH — ECONOMIC STABILITY: FOOD INSECURITY: WITHIN THE PAST 12 MONTHS, THE FOOD YOU BOUGHT JUST DIDN'T LAST AND YOU DIDN'T HAVE MONEY TO GET MORE.: NEVER TRUE

## 2025-06-27 SDOH — ECONOMIC STABILITY: FOOD INSECURITY: WITHIN THE PAST 12 MONTHS, YOU WORRIED THAT YOUR FOOD WOULD RUN OUT BEFORE YOU GOT MONEY TO BUY MORE.: NEVER TRUE

## 2025-06-27 ASSESSMENT — PATIENT HEALTH QUESTIONNAIRE - PHQ9
2. FEELING DOWN, DEPRESSED OR HOPELESS: NOT AT ALL
SUM OF ALL RESPONSES TO PHQ QUESTIONS 1-9: 0
1. LITTLE INTEREST OR PLEASURE IN DOING THINGS: NOT AT ALL
SUM OF ALL RESPONSES TO PHQ QUESTIONS 1-9: 0

## 2025-06-27 ASSESSMENT — ENCOUNTER SYMPTOMS
CHEST TIGHTNESS: 0
BLOOD IN STOOL: 0
ABDOMINAL PAIN: 0
SHORTNESS OF BREATH: 0

## 2025-06-27 NOTE — PROGRESS NOTES
oz)   SpO2 97%   BMI 24.92 kg/m²    Physical Exam  Vitals and nursing note reviewed.   Constitutional:       General: She is not in acute distress.     Appearance: She is well-developed.   HENT:      Head: Normocephalic and atraumatic.      Right Ear: Tympanic membrane, ear canal and external ear normal.      Left Ear: Tympanic membrane, ear canal and external ear normal.      Nose: Nose normal.      Mouth/Throat:      Mouth: Mucous membranes are moist.      Pharynx: Oropharynx is clear.   Eyes:      General: No scleral icterus.        Right eye: No discharge.         Left eye: No discharge.      Conjunctiva/sclera: Conjunctivae normal.   Cardiovascular:      Rate and Rhythm: Normal rate and regular rhythm.      Heart sounds: Normal heart sounds.   Pulmonary:      Effort: Pulmonary effort is normal. No respiratory distress.      Breath sounds: Normal breath sounds. No wheezing.   Abdominal:      General: There is no distension.      Palpations: Abdomen is soft.      Tenderness: There is no abdominal tenderness.   Musculoskeletal:      Cervical back: Neck supple.   Skin:     General: Skin is warm and dry.      Findings: No rash.   Neurological:      Mental Status: She is alert and oriented to person, place, and time.   Psychiatric:         Mood and Affect: Mood normal.         Behavior: Behavior normal.         ASSESSMENT/PLAN:     1. Essential hypertension  Results of recent labs discussed with patient.  Stable continue current management with lisinopril HCT    2. Mixed hyperlipidemia  Results of recent labs discussed with patient.  Lipitor was discontinued because it was causing muscle aches in patient's legs.  Patient was started on Zetia 10 mg daily instead.    3. Impaired fasting glucose  Results of recent labs discussed with patient.  Stable continue current management with diet    4. Hypokalemia  Results of recent labs discussed with patient.  Resolved.  Continue current management    No orders of the

## 2025-07-14 RX ORDER — LISINOPRIL AND HYDROCHLOROTHIAZIDE 10; 12.5 MG/1; MG/1
1 TABLET ORAL DAILY
Qty: 90 TABLET | Refills: 2 | Status: SHIPPED | OUTPATIENT
Start: 2025-07-14

## 2025-08-12 ENCOUNTER — TELEPHONE (OUTPATIENT)
Dept: FAMILY MEDICINE CLINIC | Age: 80
End: 2025-08-12